# Patient Record
Sex: FEMALE | Race: WHITE | Employment: FULL TIME | URBAN - METROPOLITAN AREA
[De-identification: names, ages, dates, MRNs, and addresses within clinical notes are randomized per-mention and may not be internally consistent; named-entity substitution may affect disease eponyms.]

---

## 2017-05-19 ENCOUNTER — TELEPHONE (OUTPATIENT)
Dept: FAMILY MEDICINE CLINIC | Age: 52
End: 2017-05-19

## 2017-05-19 DIAGNOSIS — Z00.00 ANNUAL PHYSICAL EXAM: Primary | ICD-10-CM

## 2017-05-25 DIAGNOSIS — Z00.00 ANNUAL PHYSICAL EXAM: ICD-10-CM

## 2017-05-25 LAB
A/G RATIO: 2.1 (ref 1.1–2.2)
ALBUMIN SERPL-MCNC: 4.7 G/DL (ref 3.4–5)
ALP BLD-CCNC: 75 U/L (ref 40–129)
ALT SERPL-CCNC: 19 U/L (ref 10–40)
ANION GAP SERPL CALCULATED.3IONS-SCNC: 14 MMOL/L (ref 3–16)
AST SERPL-CCNC: 22 U/L (ref 15–37)
BASOPHILS ABSOLUTE: 0 K/UL (ref 0–0.2)
BASOPHILS RELATIVE PERCENT: 0.6 %
BILIRUB SERPL-MCNC: 0.5 MG/DL (ref 0–1)
BUN BLDV-MCNC: 17 MG/DL (ref 7–20)
CALCIUM SERPL-MCNC: 9.6 MG/DL (ref 8.3–10.6)
CHLORIDE BLD-SCNC: 103 MMOL/L (ref 99–110)
CHOLESTEROL, TOTAL: 200 MG/DL (ref 0–199)
CO2: 25 MMOL/L (ref 21–32)
CREAT SERPL-MCNC: 0.7 MG/DL (ref 0.6–1.1)
EOSINOPHILS ABSOLUTE: 0.2 K/UL (ref 0–0.6)
EOSINOPHILS RELATIVE PERCENT: 3.8 %
GFR AFRICAN AMERICAN: >60
GFR NON-AFRICAN AMERICAN: >60
GLOBULIN: 2.2 G/DL
GLUCOSE BLD-MCNC: 91 MG/DL (ref 70–99)
HCT VFR BLD CALC: 41.2 % (ref 36–48)
HDLC SERPL-MCNC: 64 MG/DL (ref 40–60)
HEMOGLOBIN: 13.5 G/DL (ref 12–16)
LDL CHOLESTEROL CALCULATED: 114 MG/DL
LYMPHOCYTES ABSOLUTE: 2.1 K/UL (ref 1–5.1)
LYMPHOCYTES RELATIVE PERCENT: 36 %
MCH RBC QN AUTO: 30.5 PG (ref 26–34)
MCHC RBC AUTO-ENTMCNC: 32.8 G/DL (ref 31–36)
MCV RBC AUTO: 92.9 FL (ref 80–100)
MONOCYTES ABSOLUTE: 0.6 K/UL (ref 0–1.3)
MONOCYTES RELATIVE PERCENT: 10.8 %
NEUTROPHILS ABSOLUTE: 2.8 K/UL (ref 1.7–7.7)
NEUTROPHILS RELATIVE PERCENT: 48.8 %
PDW BLD-RTO: 12.7 % (ref 12.4–15.4)
PLATELET # BLD: 258 K/UL (ref 135–450)
PMV BLD AUTO: 8.4 FL (ref 5–10.5)
POTASSIUM SERPL-SCNC: 4.4 MMOL/L (ref 3.5–5.1)
RBC # BLD: 4.44 M/UL (ref 4–5.2)
SODIUM BLD-SCNC: 142 MMOL/L (ref 136–145)
TOTAL PROTEIN: 6.9 G/DL (ref 6.4–8.2)
TRIGL SERPL-MCNC: 111 MG/DL (ref 0–150)
TSH REFLEX: 1.55 UIU/ML (ref 0.27–4.2)
VITAMIN D 25-HYDROXY: 30.5 NG/ML
VLDLC SERPL CALC-MCNC: 22 MG/DL
WBC # BLD: 5.8 K/UL (ref 4–11)

## 2017-05-31 ENCOUNTER — OFFICE VISIT (OUTPATIENT)
Dept: FAMILY MEDICINE CLINIC | Age: 52
End: 2017-05-31

## 2017-05-31 VITALS
HEART RATE: 65 BPM | SYSTOLIC BLOOD PRESSURE: 98 MMHG | HEIGHT: 61 IN | DIASTOLIC BLOOD PRESSURE: 76 MMHG | BODY MASS INDEX: 27.79 KG/M2 | OXYGEN SATURATION: 98 % | RESPIRATION RATE: 16 BRPM | WEIGHT: 147.2 LBS

## 2017-05-31 DIAGNOSIS — Z23 NEED FOR PNEUMOCOCCAL VACCINATION: ICD-10-CM

## 2017-05-31 DIAGNOSIS — R31.9 BLOOD IN URINE: ICD-10-CM

## 2017-05-31 DIAGNOSIS — Z00.00 PHYSICAL EXAM: Primary | ICD-10-CM

## 2017-05-31 DIAGNOSIS — Z71.2 ENCOUNTER TO DISCUSS TEST RESULTS: ICD-10-CM

## 2017-05-31 LAB
BACTERIA URINE, POC: NORMAL
BILIRUBIN URINE: 0 MG/DL
BLOOD, URINE: POSITIVE
CASTS URINE, POC: NORMAL
CLARITY: CLEAR
COLOR: YELLOW
CRYSTALS URINE, POC: NORMAL
EPI CELLS URINE, POC: NORMAL
GLUCOSE URINE: NORMAL
KETONES, URINE: NEGATIVE
LEUKOCYTE EST, POC: NORMAL
NITRITE, URINE: NEGATIVE
PH UA: 6 (ref 4.5–8)
PROTEIN UA: NEGATIVE
RBC URINE, POC: NORMAL
SPECIFIC GRAVITY UA: 1.01 (ref 1–1.03)
UROBILINOGEN, URINE: NORMAL
WBC URINE, POC: NORMAL
YEAST URINE, POC: NORMAL

## 2017-05-31 PROCEDURE — 90732 PPSV23 VACC 2 YRS+ SUBQ/IM: CPT | Performed by: FAMILY MEDICINE

## 2017-05-31 PROCEDURE — 99396 PREV VISIT EST AGE 40-64: CPT | Performed by: FAMILY MEDICINE

## 2017-05-31 PROCEDURE — 90471 IMMUNIZATION ADMIN: CPT | Performed by: FAMILY MEDICINE

## 2017-05-31 PROCEDURE — 81000 URINALYSIS NONAUTO W/SCOPE: CPT | Performed by: FAMILY MEDICINE

## 2017-05-31 ASSESSMENT — PATIENT HEALTH QUESTIONNAIRE - PHQ9
2. FEELING DOWN, DEPRESSED OR HOPELESS: 0
SUM OF ALL RESPONSES TO PHQ9 QUESTIONS 1 & 2: 0
SUM OF ALL RESPONSES TO PHQ QUESTIONS 1-9: 0
1. LITTLE INTEREST OR PLEASURE IN DOING THINGS: 0

## 2017-06-02 LAB — URINE CULTURE, ROUTINE: NORMAL

## 2017-06-29 ENCOUNTER — HOSPITAL ENCOUNTER (OUTPATIENT)
Dept: MAMMOGRAPHY | Age: 52
Discharge: HOME | End: 2017-06-29
Attending: OBSTETRICS & GYNECOLOGY | Admitting: OBSTETRICS & GYNECOLOGY

## 2017-06-29 DIAGNOSIS — Z12.31 VISIT FOR SCREENING MAMMOGRAM: ICD-10-CM

## 2017-11-22 ENCOUNTER — OFFICE VISIT (OUTPATIENT)
Dept: FAMILY MEDICINE CLINIC | Age: 52
End: 2017-11-22

## 2017-11-22 VITALS
RESPIRATION RATE: 16 BRPM | WEIGHT: 147.8 LBS | DIASTOLIC BLOOD PRESSURE: 75 MMHG | HEART RATE: 85 BPM | SYSTOLIC BLOOD PRESSURE: 100 MMHG | OXYGEN SATURATION: 97 % | TEMPERATURE: 97.9 F | HEIGHT: 61 IN | BODY MASS INDEX: 27.9 KG/M2

## 2017-11-22 DIAGNOSIS — J06.9 UPPER RESPIRATORY TRACT INFECTION, UNSPECIFIED TYPE: Primary | ICD-10-CM

## 2017-11-22 DIAGNOSIS — J34.89 SINUS PAIN: ICD-10-CM

## 2017-11-22 DIAGNOSIS — H69.83 EUSTACHIAN TUBE DYSFUNCTION, BILATERAL: ICD-10-CM

## 2017-11-22 PROCEDURE — 99214 OFFICE O/P EST MOD 30 MIN: CPT | Performed by: FAMILY MEDICINE

## 2017-11-22 RX ORDER — LORATADINE 10 MG/1
10 TABLET ORAL DAILY
Qty: 30 TABLET | Refills: 1 | Status: SHIPPED | OUTPATIENT
Start: 2017-11-22 | End: 2017-12-22

## 2017-11-22 RX ORDER — AZITHROMYCIN 250 MG/1
TABLET, FILM COATED ORAL
Qty: 6 TABLET | Refills: 0 | Status: SHIPPED | OUTPATIENT
Start: 2017-11-22 | End: 2018-11-27

## 2017-11-22 RX ORDER — FLUTICASONE PROPIONATE 50 MCG
2 SPRAY, SUSPENSION (ML) NASAL DAILY
Qty: 1 BOTTLE | Refills: 3 | Status: SHIPPED | OUTPATIENT
Start: 2017-11-22 | End: 2018-11-30 | Stop reason: ALTCHOICE

## 2017-11-22 NOTE — PROGRESS NOTES
Roomed By Iqra Beal
rhonchi. Symmetric rise  CARDIOVASCULAR: RRR,  no murmur, no rub, pulses 2+  EXTREMETIES: Normal movement of all extremities. No edema. No joint swelling. NEURO:  GCS 15, CN2-12 grossly intact, no focal motor/sensory deficits, no cerebellar deficits, normal gait, normal speech      Assessment/Plan     67-year-old female with upper respiratory infection. We'll treat with azithromycin. There may be also a seasonal allergy component, we'll treat with Claritin. Can also be a component of sinusitis, we'll treat with Flonase in addition to azithromycin. Discussed with patient medication/s dosage, instructions, potential S/E, A/R and Drug Interaction  Educational material provided regarding patient's condition  Tylenol or Motrin as needed for pain or fever  Advise to return here if worse or go to nearest ER  Encourage fluids  Pt discharged in stable condition at 10:56      1. Upper respiratory tract infection, unspecified type    - azithromycin (ZITHROMAX) 250 MG tablet; Take 2 tablets on day 1 and 1 tablet on days 2-5  Dispense: 6 tablet; Refill: 0    2. Eustachian tube dysfunction, bilateral    - fluticasone (FLONASE) 50 MCG/ACT nasal spray; 2 sprays by Nasal route daily  Dispense: 1 Bottle; Refill: 3  - loratadine (CLARITIN) 10 MG tablet; Take 1 tablet by mouth daily  Dispense: 30 tablet; Refill: 1    3. Sinus pain        No orders of the defined types were placed in this encounter. Return if symptoms worsen or fail to improve.     Mary Ann Moore MD    11/22/2017  10:55 AM

## 2017-11-22 NOTE — PATIENT INSTRUCTIONS
and mold. Allergies can be mild or severe. Mild allergies can be managed with home treatment. But medicine may be needed to prevent problems. Managing your allergies is an important part of staying healthy. Your doctor may suggest that you have allergy testing to help find out what is causing your allergies. When you know what things trigger your symptoms, you can avoid them. This can prevent allergy symptoms and other health problems. For severe allergies that cause reactions that affect your whole body (anaphylactic reactions), your doctor may prescribe a shot of epinephrine to carry with you in case you have a severe reaction. Learn how to give yourself the shot and keep it with you at all times. Make sure it is not . Follow-up care is a key part of your treatment and safety. Be sure to make and go to all appointments, and call your doctor if you are having problems. It's also a good idea to know your test results and keep a list of the medicines you take. How can you care for yourself at home? · If you have been told by your doctor that dust or dust mites are causing your allergy, decrease the dust around your bed:  [facility] sheets, pillowcases, and other bedding in hot water every week. ¨ Use dust-proof covers for pillows, duvets, and mattresses. Avoid plastic covers because they tear easily and do not \"breathe. \" Wash as instructed on the label. ¨ Do not use any blankets and pillows that you do not need. ¨ Use blankets that you can wash in your washing machine. ¨ Consider removing drapes and carpets, which attract and hold dust, from your bedroom. · If you are allergic to house dust and mites, do not use home humidifiers. Your doctor can suggest ways you can control dust and mites. · Look for signs of cockroaches. Cockroaches cause allergic reactions. Use cockroach baits to get rid of them. Then, clean your home well.  Cockroaches like areas where grocery bags, newspapers, empty bottles, or cardboard boxes are stored. Do not keep these inside your home, and keep trash and food containers sealed. Seal off any spots where cockroaches might enter your home. · If you are allergic to mold, get rid of furniture, rugs, and drapes that smell musty. Check for mold in the bathroom. · If you are allergic to outdoor pollen or mold spores, use air-conditioning. Change or clean all filters every month. Keep windows closed. · If you are allergic to pollen, stay inside when pollen counts are high. Use a vacuum  with a HEPA filter or a double-thickness filter at least two times each week. · Stay inside when air pollution is bad. Avoid paint fumes, perfumes, and other strong odors. · Avoid conditions that make your allergies worse. Stay away from smoke. Do not smoke or let anyone else smoke in your house. Do not use fireplaces or wood-burning stoves. · If you are allergic to your pets, change the air filter in your furnace every month. Use high-efficiency filters. · If you are allergic to pet dander, keep pets outside or out of your bedroom. Old carpet and cloth furniture can hold a lot of animal dander. You may need to replace them. When should you call for help? Give an epinephrine shot if:  · You think you are having a severe allergic reaction. · You have symptoms in more than one body area, such as mild nausea and an itchy mouth. After giving an epinephrine shot call 911, even if you feel better. Call 911 if:  · You have symptoms of a severe allergic reaction. These may include:  ¨ Sudden raised, red areas (hives) all over your body. ¨ Swelling of the throat, mouth, lips, or tongue. ¨ Trouble breathing. ¨ Passing out (losing consciousness). Or you may feel very lightheaded or suddenly feel weak, confused, or restless. · You have been given an epinephrine shot, even if you feel better.   Call your doctor now or seek immediate medical care if:  · You have symptoms of an allergic reaction, such

## 2018-05-24 ENCOUNTER — TELEPHONE (OUTPATIENT)
Dept: FAMILY MEDICINE CLINIC | Age: 53
End: 2018-05-24

## 2018-05-24 DIAGNOSIS — Z00.00 ANNUAL PHYSICAL EXAM: ICD-10-CM

## 2018-05-24 DIAGNOSIS — E55.9 VITAMIN D DEFICIENCY: Primary | ICD-10-CM

## 2018-05-31 DIAGNOSIS — Z00.00 ANNUAL PHYSICAL EXAM: ICD-10-CM

## 2018-05-31 DIAGNOSIS — E55.9 VITAMIN D DEFICIENCY: ICD-10-CM

## 2018-05-31 LAB
A/G RATIO: 1.8 (ref 1.1–2.2)
ALBUMIN SERPL-MCNC: 4.6 G/DL (ref 3.4–5)
ALP BLD-CCNC: 72 U/L (ref 40–129)
ALT SERPL-CCNC: 17 U/L (ref 10–40)
ANION GAP SERPL CALCULATED.3IONS-SCNC: 13 MMOL/L (ref 3–16)
AST SERPL-CCNC: 22 U/L (ref 15–37)
BASOPHILS ABSOLUTE: 0 K/UL (ref 0–0.2)
BASOPHILS RELATIVE PERCENT: 0.4 %
BILIRUB SERPL-MCNC: 0.4 MG/DL (ref 0–1)
BUN BLDV-MCNC: 15 MG/DL (ref 7–20)
CALCIUM SERPL-MCNC: 9.7 MG/DL (ref 8.3–10.6)
CHLORIDE BLD-SCNC: 104 MMOL/L (ref 99–110)
CHOLESTEROL, TOTAL: 197 MG/DL (ref 0–199)
CO2: 26 MMOL/L (ref 21–32)
CREAT SERPL-MCNC: 0.7 MG/DL (ref 0.6–1.1)
EOSINOPHILS ABSOLUTE: 0.2 K/UL (ref 0–0.6)
EOSINOPHILS RELATIVE PERCENT: 3.4 %
GFR AFRICAN AMERICAN: >60
GFR NON-AFRICAN AMERICAN: >60
GLOBULIN: 2.5 G/DL
GLUCOSE BLD-MCNC: 89 MG/DL (ref 70–99)
HCT VFR BLD CALC: 39.7 % (ref 36–48)
HDLC SERPL-MCNC: 68 MG/DL (ref 40–60)
HEMOGLOBIN: 13.9 G/DL (ref 12–16)
LDL CHOLESTEROL CALCULATED: 110 MG/DL
LYMPHOCYTES ABSOLUTE: 2.2 K/UL (ref 1–5.1)
LYMPHOCYTES RELATIVE PERCENT: 36.6 %
MCH RBC QN AUTO: 31.8 PG (ref 26–34)
MCHC RBC AUTO-ENTMCNC: 35 G/DL (ref 31–36)
MCV RBC AUTO: 90.9 FL (ref 80–100)
MONOCYTES ABSOLUTE: 0.7 K/UL (ref 0–1.3)
MONOCYTES RELATIVE PERCENT: 11.3 %
NEUTROPHILS ABSOLUTE: 2.9 K/UL (ref 1.7–7.7)
NEUTROPHILS RELATIVE PERCENT: 48.3 %
PDW BLD-RTO: 12.4 % (ref 12.4–15.4)
PLATELET # BLD: 351 K/UL (ref 135–450)
PMV BLD AUTO: 7.8 FL (ref 5–10.5)
POTASSIUM SERPL-SCNC: 4.9 MMOL/L (ref 3.5–5.1)
RBC # BLD: 4.37 M/UL (ref 4–5.2)
SODIUM BLD-SCNC: 143 MMOL/L (ref 136–145)
TOTAL PROTEIN: 7.1 G/DL (ref 6.4–8.2)
TRIGL SERPL-MCNC: 95 MG/DL (ref 0–150)
TSH REFLEX: 1.65 UIU/ML (ref 0.27–4.2)
VITAMIN D 25-HYDROXY: 26.5 NG/ML
VLDLC SERPL CALC-MCNC: 19 MG/DL
WBC # BLD: 6 K/UL (ref 4–11)

## 2018-06-04 ENCOUNTER — OFFICE VISIT (OUTPATIENT)
Dept: FAMILY MEDICINE CLINIC | Age: 53
End: 2018-06-04

## 2018-06-04 VITALS
WEIGHT: 147.2 LBS | HEART RATE: 68 BPM | SYSTOLIC BLOOD PRESSURE: 100 MMHG | BODY MASS INDEX: 27.79 KG/M2 | DIASTOLIC BLOOD PRESSURE: 80 MMHG | HEIGHT: 61 IN | OXYGEN SATURATION: 99 %

## 2018-06-04 DIAGNOSIS — Z71.2 ENCOUNTER TO DISCUSS TEST RESULTS: ICD-10-CM

## 2018-06-04 DIAGNOSIS — Z80.51 FHX: KIDNEY CANCER: ICD-10-CM

## 2018-06-04 DIAGNOSIS — E55.9 VITAMIN D DEFICIENCY: ICD-10-CM

## 2018-06-04 DIAGNOSIS — Z00.00 PHYSICAL EXAM, ROUTINE: Primary | ICD-10-CM

## 2018-06-04 DIAGNOSIS — Z80.0 FHX: COLON CANCER: ICD-10-CM

## 2018-06-04 LAB
BACTERIA URINE, POC: ABNORMAL
BILIRUBIN URINE: 0 MG/DL
BLOOD, URINE: NEGATIVE
CASTS URINE, POC: ABNORMAL
CLARITY: ABNORMAL
COLOR: COLORLESS
CRYSTALS URINE, POC: ABNORMAL
EPI CELLS URINE, POC: ABNORMAL
GLUCOSE URINE: ABNORMAL
KETONES, URINE: NEGATIVE
LEUKOCYTE EST, POC: ABNORMAL
NITRITE, URINE: NEGATIVE
PH UA: 7 (ref 4.5–8)
PROTEIN UA: NEGATIVE
RBC URINE, POC: ABNORMAL
SPECIFIC GRAVITY UA: 1.01 (ref 1–1.03)
UROBILINOGEN, URINE: NORMAL
WBC URINE, POC: ABNORMAL
YEAST URINE, POC: ABNORMAL

## 2018-06-04 PROCEDURE — 99396 PREV VISIT EST AGE 40-64: CPT | Performed by: FAMILY MEDICINE

## 2018-06-04 PROCEDURE — 81000 URINALYSIS NONAUTO W/SCOPE: CPT | Performed by: FAMILY MEDICINE

## 2018-06-04 ASSESSMENT — PATIENT HEALTH QUESTIONNAIRE - PHQ9
SUM OF ALL RESPONSES TO PHQ9 QUESTIONS 1 & 2: 0
SUM OF ALL RESPONSES TO PHQ QUESTIONS 1-9: 0
1. LITTLE INTEREST OR PLEASURE IN DOING THINGS: 0
2. FEELING DOWN, DEPRESSED OR HOPELESS: 0

## 2018-06-06 LAB — URINE CULTURE, ROUTINE: NORMAL

## 2018-06-18 ENCOUNTER — OFFICE VISIT (OUTPATIENT)
Dept: FAMILY MEDICINE CLINIC | Age: 53
End: 2018-06-18

## 2018-06-18 VITALS
WEIGHT: 145.4 LBS | SYSTOLIC BLOOD PRESSURE: 100 MMHG | BODY MASS INDEX: 27.47 KG/M2 | OXYGEN SATURATION: 98 % | HEART RATE: 110 BPM | DIASTOLIC BLOOD PRESSURE: 64 MMHG

## 2018-06-18 DIAGNOSIS — N30.00 ACUTE CYSTITIS WITHOUT HEMATURIA: ICD-10-CM

## 2018-06-18 DIAGNOSIS — R10.32 INTERMITTENT LEFT LOWER QUADRANT ABDOMINAL PAIN: Primary | ICD-10-CM

## 2018-06-18 LAB
BACTERIA URINE, POC: NORMAL
BILIRUBIN URINE: 0 MG/DL
BLOOD, URINE: POSITIVE
CASTS URINE, POC: NORMAL
CLARITY: CLEAR
COLOR: YELLOW
CRYSTALS URINE, POC: NORMAL
EPI CELLS URINE, POC: NORMAL
GLUCOSE URINE: NORMAL
KETONES, URINE: NEGATIVE
LEUKOCYTE EST, POC: NORMAL
NITRITE, URINE: NEGATIVE
PH UA: 7 (ref 4.5–8)
PROTEIN UA: NEGATIVE
RBC URINE, POC: NORMAL
SPECIFIC GRAVITY UA: 1.01 (ref 1–1.03)
UROBILINOGEN, URINE: NORMAL
WBC URINE, POC: NORMAL
YEAST URINE, POC: NORMAL

## 2018-06-18 PROCEDURE — 99213 OFFICE O/P EST LOW 20 MIN: CPT | Performed by: FAMILY MEDICINE

## 2018-06-18 PROCEDURE — 81000 URINALYSIS NONAUTO W/SCOPE: CPT | Performed by: FAMILY MEDICINE

## 2018-06-18 RX ORDER — CIPROFLOXACIN 250 MG/1
250 TABLET, FILM COATED ORAL 2 TIMES DAILY
Qty: 20 TABLET | Refills: 0 | Status: SHIPPED | OUTPATIENT
Start: 2018-06-18 | End: 2018-06-28

## 2018-06-20 ENCOUNTER — TELEPHONE (OUTPATIENT)
Dept: FAMILY MEDICINE CLINIC | Age: 53
End: 2018-06-20

## 2018-06-20 LAB — URINE CULTURE, ROUTINE: NORMAL

## 2018-06-21 ENCOUNTER — HOSPITAL ENCOUNTER (OUTPATIENT)
Dept: CT IMAGING | Age: 53
Discharge: HOME | End: 2018-06-21
Attending: FAMILY MEDICINE | Admitting: FAMILY MEDICINE

## 2018-06-21 DIAGNOSIS — R10.32 INTERMITTENT LEFT LOWER QUADRANT ABDOMINAL PAIN: ICD-10-CM

## 2018-06-21 DIAGNOSIS — R10.32 LEFT LOWER QUADRANT PAIN: ICD-10-CM

## 2018-06-21 RX ORDER — METRONIDAZOLE 500 MG/1
500 TABLET ORAL 3 TIMES DAILY
Qty: 21 TABLET | Refills: 0 | Status: SHIPPED | OUTPATIENT
Start: 2018-06-21 | End: 2018-06-28

## 2018-06-29 ENCOUNTER — HOSPITAL ENCOUNTER (OUTPATIENT)
Dept: MAMMOGRAPHY | Age: 53
Discharge: HOME | End: 2018-06-29
Attending: FAMILY MEDICINE | Admitting: FAMILY MEDICINE

## 2018-06-29 DIAGNOSIS — Z12.31 VISIT FOR SCREENING MAMMOGRAM: ICD-10-CM

## 2018-08-13 ENCOUNTER — TELEPHONE (OUTPATIENT)
Dept: FAMILY MEDICINE CLINIC | Age: 53
End: 2018-08-13

## 2018-08-13 NOTE — TELEPHONE ENCOUNTER
Pt states Dr. Rosalba Stone left a message for pt stating she needs an MMR booster and pt could be squeezed in. Scheduled MA visit for Sat. 8/18/18.

## 2018-08-18 ENCOUNTER — NURSE ONLY (OUTPATIENT)
Dept: FAMILY MEDICINE CLINIC | Age: 53
End: 2018-08-18

## 2018-08-18 DIAGNOSIS — Z23 NEED FOR MMR VACCINE: Primary | ICD-10-CM

## 2018-08-18 PROCEDURE — 90707 MMR VACCINE SC: CPT | Performed by: NURSE PRACTITIONER

## 2018-08-18 PROCEDURE — 90471 IMMUNIZATION ADMIN: CPT | Performed by: NURSE PRACTITIONER

## 2018-08-23 ENCOUNTER — TELEPHONE (OUTPATIENT)
Dept: FAMILY MEDICINE CLINIC | Age: 53
End: 2018-08-23

## 2018-08-23 NOTE — TELEPHONE ENCOUNTER
I gave signed form to front office on Tues to mail to her   But will redo as completed form  was not scanned in as requested   Plz Put copy in my basket to complete again

## 2018-08-23 NOTE — TELEPHONE ENCOUNTER
Patient left a employment form for completion by Dr. Obed Payton  Patient did have the MMR booster at the office and thought form would be completed  Please check on form and contact patient

## 2018-11-15 ENCOUNTER — HOSPITAL ENCOUNTER (OUTPATIENT)
Dept: MAMMOGRAPHY | Age: 53
Discharge: HOME | End: 2018-11-15
Payer: COMMERCIAL

## 2018-11-15 DIAGNOSIS — N63.0 LUMP OR MASS IN BREAST: ICD-10-CM

## 2018-11-15 PROCEDURE — 88305 TISSUE EXAM BY PATHOLOGIST: CPT

## 2018-11-15 PROCEDURE — G0279 TOMOSYNTHESIS, MAMMO: HCPCS

## 2018-11-15 PROCEDURE — 88360 TUMOR IMMUNOHISTOCHEM/MANUAL: CPT

## 2018-11-15 PROCEDURE — 88342 IMHCHEM/IMCYTCHM 1ST ANTB: CPT

## 2018-11-15 PROCEDURE — 88341 IMHCHEM/IMCYTCHM EA ADD ANTB: CPT

## 2018-11-27 ENCOUNTER — OFFICE VISIT (OUTPATIENT)
Dept: FAMILY MEDICINE CLINIC | Age: 53
End: 2018-11-27
Payer: COMMERCIAL

## 2018-11-27 ENCOUNTER — HOSPITAL ENCOUNTER (OUTPATIENT)
Dept: GENERAL RADIOLOGY | Age: 53
Discharge: HOME | End: 2018-11-27
Payer: COMMERCIAL

## 2018-11-27 ENCOUNTER — HOSPITAL ENCOUNTER (OUTPATIENT)
Age: 53
Discharge: HOME | End: 2018-11-27
Payer: COMMERCIAL

## 2018-11-27 VITALS
OXYGEN SATURATION: 98 % | HEIGHT: 61 IN | WEIGHT: 143 LBS | SYSTOLIC BLOOD PRESSURE: 120 MMHG | BODY MASS INDEX: 27 KG/M2 | TEMPERATURE: 99.5 F | HEART RATE: 74 BPM | DIASTOLIC BLOOD PRESSURE: 74 MMHG

## 2018-11-27 DIAGNOSIS — Z01.818 PRE-OP EXAM: ICD-10-CM

## 2018-11-27 DIAGNOSIS — Z01.818 PRE-OP EXAM: Primary | ICD-10-CM

## 2018-11-27 DIAGNOSIS — C50.012 PAGET'S DISEASE OF LEFT BREAST (HCC): ICD-10-CM

## 2018-11-27 LAB
A/G RATIO: 1.8 (ref 1.1–2.2)
ALBUMIN SERPL-MCNC: 4.9 G/DL (ref 3.4–5)
ALP BLD-CCNC: 82 U/L (ref 40–129)
ALT SERPL-CCNC: 13 U/L (ref 10–40)
ANION GAP SERPL CALCULATED.3IONS-SCNC: 12 MMOL/L (ref 3–16)
AST SERPL-CCNC: 17 U/L (ref 15–37)
BASOPHILS ABSOLUTE: 0.1 K/UL (ref 0–0.2)
BASOPHILS RELATIVE PERCENT: 1 %
BILIRUB SERPL-MCNC: 0.3 MG/DL (ref 0–1)
BUN BLDV-MCNC: 15 MG/DL (ref 7–20)
CALCIUM SERPL-MCNC: 10.5 MG/DL (ref 8.3–10.6)
CHLORIDE BLD-SCNC: 103 MMOL/L (ref 99–110)
CO2: 26 MMOL/L (ref 21–32)
CREAT SERPL-MCNC: 0.6 MG/DL (ref 0.6–1.1)
EOSINOPHILS ABSOLUTE: 0.1 K/UL (ref 0–0.6)
EOSINOPHILS RELATIVE PERCENT: 1.4 %
GFR AFRICAN AMERICAN: >60
GFR NON-AFRICAN AMERICAN: >60
GLOBULIN: 2.7 G/DL
GLUCOSE BLD-MCNC: 97 MG/DL (ref 70–99)
HCT VFR BLD CALC: 39.9 % (ref 36–48)
HEMOGLOBIN: 13.6 G/DL (ref 12–16)
HEPATITIS C ANTIBODY INTERPRETATION: NORMAL
LYMPHOCYTES ABSOLUTE: 1.9 K/UL (ref 1–5.1)
LYMPHOCYTES RELATIVE PERCENT: 29.5 %
MCH RBC QN AUTO: 31.8 PG (ref 26–34)
MCHC RBC AUTO-ENTMCNC: 34 G/DL (ref 31–36)
MCV RBC AUTO: 93.6 FL (ref 80–100)
MONOCYTES ABSOLUTE: 0.6 K/UL (ref 0–1.3)
MONOCYTES RELATIVE PERCENT: 9.8 %
NEUTROPHILS ABSOLUTE: 3.7 K/UL (ref 1.7–7.7)
NEUTROPHILS RELATIVE PERCENT: 58.3 %
PDW BLD-RTO: 12.3 % (ref 12.4–15.4)
PLATELET # BLD: 305 K/UL (ref 135–450)
PMV BLD AUTO: 8.5 FL (ref 5–10.5)
POTASSIUM SERPL-SCNC: 4.7 MMOL/L (ref 3.5–5.1)
RBC # BLD: 4.26 M/UL (ref 4–5.2)
SODIUM BLD-SCNC: 141 MMOL/L (ref 136–145)
TOTAL PROTEIN: 7.6 G/DL (ref 6.4–8.2)
WBC # BLD: 6.3 K/UL (ref 4–11)

## 2018-11-27 PROCEDURE — 90471 IMMUNIZATION ADMIN: CPT | Performed by: FAMILY MEDICINE

## 2018-11-27 PROCEDURE — 90682 RIV4 VACC RECOMBINANT DNA IM: CPT | Performed by: FAMILY MEDICINE

## 2018-11-27 PROCEDURE — 99214 OFFICE O/P EST MOD 30 MIN: CPT | Performed by: FAMILY MEDICINE

## 2018-11-27 PROCEDURE — 71046 X-RAY EXAM CHEST 2 VIEWS: CPT

## 2018-11-28 ENCOUNTER — HOSPITAL ENCOUNTER (OUTPATIENT)
Dept: MRI IMAGING | Age: 53
Discharge: HOME | End: 2018-11-28
Payer: COMMERCIAL

## 2018-11-28 DIAGNOSIS — C50.012 PAGET'S DISEASE OF THE BREAST, LEFT (HCC): ICD-10-CM

## 2018-11-28 LAB
HIV AG/AB: NORMAL
HIV ANTIGEN: NORMAL
HIV-1 ANTIBODY: NORMAL
HIV-2 AB: NORMAL

## 2018-11-28 PROCEDURE — A9579 GAD-BASE MR CONTRAST NOS,1ML: HCPCS | Performed by: SURGERY

## 2018-11-28 PROCEDURE — C8908 MRI W/O FOL W/CONT, BREAST,: HCPCS

## 2018-11-28 PROCEDURE — 6360000004 HC RX CONTRAST MEDICATION: Performed by: SURGERY

## 2018-11-28 RX ADMIN — GADOTERIDOL 14 ML: 279.3 INJECTION, SOLUTION INTRAVENOUS at 09:26

## 2018-11-30 DIAGNOSIS — C50.012 PAGET'S CARCINOMA OF THE NIPPLE, LEFT (HCC): ICD-10-CM

## 2018-11-30 DIAGNOSIS — R92.8 ABNORMAL MRI, BREAST: Primary | ICD-10-CM

## 2018-12-03 ENCOUNTER — HOSPITAL ENCOUNTER (OUTPATIENT)
Dept: ULTRASOUND IMAGING | Age: 53
Discharge: HOME | End: 2018-12-03
Payer: COMMERCIAL

## 2018-12-03 ENCOUNTER — ANESTHESIA EVENT (OUTPATIENT)
Dept: OPERATING ROOM | Age: 53
End: 2018-12-03
Payer: COMMERCIAL

## 2018-12-03 DIAGNOSIS — R92.8 ABNORMAL MRI, BREAST: ICD-10-CM

## 2018-12-03 PROCEDURE — 76642 ULTRASOUND BREAST LIMITED: CPT

## 2018-12-04 ENCOUNTER — HOSPITAL ENCOUNTER (OUTPATIENT)
Age: 53
Setting detail: OUTPATIENT SURGERY
Discharge: HOME | End: 2018-12-04
Attending: SURGERY | Admitting: SURGERY
Payer: COMMERCIAL

## 2018-12-04 ENCOUNTER — ANESTHESIA (OUTPATIENT)
Dept: OPERATING ROOM | Age: 53
End: 2018-12-04
Payer: COMMERCIAL

## 2018-12-04 VITALS
HEIGHT: 61 IN | WEIGHT: 143 LBS | SYSTOLIC BLOOD PRESSURE: 106 MMHG | HEART RATE: 60 BPM | BODY MASS INDEX: 27 KG/M2 | DIASTOLIC BLOOD PRESSURE: 69 MMHG | TEMPERATURE: 97.8 F | OXYGEN SATURATION: 100 % | RESPIRATION RATE: 13 BRPM

## 2018-12-04 VITALS — SYSTOLIC BLOOD PRESSURE: 77 MMHG | OXYGEN SATURATION: 95 % | DIASTOLIC BLOOD PRESSURE: 41 MMHG

## 2018-12-04 DIAGNOSIS — C50.012 PAGET'S CARCINOMA OF THE NIPPLE, LEFT (HCC): Primary | ICD-10-CM

## 2018-12-04 LAB — PREGNANCY, URINE: NEGATIVE

## 2018-12-04 PROCEDURE — 7100000010 HC PHASE II RECOVERY - FIRST 15 MIN: Performed by: SURGERY

## 2018-12-04 PROCEDURE — 7100000001 HC PACU RECOVERY - ADDTL 15 MIN: Performed by: SURGERY

## 2018-12-04 PROCEDURE — 6360000002 HC RX W HCPCS: Performed by: SURGERY

## 2018-12-04 PROCEDURE — 3700000000 HC ANESTHESIA ATTENDED CARE: Performed by: SURGERY

## 2018-12-04 PROCEDURE — 84703 CHORIONIC GONADOTROPIN ASSAY: CPT

## 2018-12-04 PROCEDURE — A4648 IMPLANTABLE TISSUE MARKER: HCPCS | Performed by: SURGERY

## 2018-12-04 PROCEDURE — 88305 TISSUE EXAM BY PATHOLOGIST: CPT

## 2018-12-04 PROCEDURE — 2500000003 HC RX 250 WO HCPCS: Performed by: NURSE ANESTHETIST, CERTIFIED REGISTERED

## 2018-12-04 PROCEDURE — 88360 TUMOR IMMUNOHISTOCHEM/MANUAL: CPT

## 2018-12-04 PROCEDURE — 3700000001 HC ADD 15 MINUTES (ANESTHESIA): Performed by: SURGERY

## 2018-12-04 PROCEDURE — 6360000002 HC RX W HCPCS: Performed by: NURSE ANESTHETIST, CERTIFIED REGISTERED

## 2018-12-04 PROCEDURE — 3600000003 HC SURGERY LEVEL 3 BASE: Performed by: SURGERY

## 2018-12-04 PROCEDURE — 2500000003 HC RX 250 WO HCPCS: Performed by: SURGERY

## 2018-12-04 PROCEDURE — 88307 TISSUE EXAM BY PATHOLOGIST: CPT

## 2018-12-04 PROCEDURE — 2580000003 HC RX 258: Performed by: ANESTHESIOLOGY

## 2018-12-04 PROCEDURE — 2709999900 HC NON-CHARGEABLE SUPPLY: Performed by: SURGERY

## 2018-12-04 PROCEDURE — 7100000000 HC PACU RECOVERY - FIRST 15 MIN: Performed by: SURGERY

## 2018-12-04 PROCEDURE — 3600000013 HC SURGERY LEVEL 3 ADDTL 15MIN: Performed by: SURGERY

## 2018-12-04 PROCEDURE — 2580000003 HC RX 258: Performed by: SURGERY

## 2018-12-04 PROCEDURE — 7100000011 HC PHASE II RECOVERY - ADDTL 15 MIN: Performed by: SURGERY

## 2018-12-04 DEVICE — THE MARKER IS A RADIOGRAPHIC IMPLANTABLE MARKER USED TO MARK SOFT TISSUE.IT IS COMPRISED OF A BIOABSORBABLE SPACER THAT HOLDS RADIOPAQUE MARKER CLIPS.
Type: IMPLANTABLE DEVICE | Site: BREAST | Status: FUNCTIONAL
Brand: BIOZORB MARKER

## 2018-12-04 RX ORDER — FENTANYL CITRATE 50 UG/ML
50 INJECTION, SOLUTION INTRAMUSCULAR; INTRAVENOUS EVERY 5 MIN PRN
Status: DISCONTINUED | OUTPATIENT
Start: 2018-12-04 | End: 2018-12-04 | Stop reason: HOSPADM

## 2018-12-04 RX ORDER — FENTANYL CITRATE 50 UG/ML
INJECTION, SOLUTION INTRAMUSCULAR; INTRAVENOUS PRN
Status: DISCONTINUED | OUTPATIENT
Start: 2018-12-04 | End: 2018-12-04 | Stop reason: SDUPTHER

## 2018-12-04 RX ORDER — MAGNESIUM HYDROXIDE 1200 MG/15ML
LIQUID ORAL CONTINUOUS PRN
Status: DISCONTINUED | OUTPATIENT
Start: 2018-12-04 | End: 2018-12-04 | Stop reason: HOSPADM

## 2018-12-04 RX ORDER — SODIUM CHLORIDE 0.9 % (FLUSH) 0.9 %
10 SYRINGE (ML) INJECTION EVERY 12 HOURS SCHEDULED
Status: DISCONTINUED | OUTPATIENT
Start: 2018-12-04 | End: 2018-12-04 | Stop reason: HOSPADM

## 2018-12-04 RX ORDER — PROPOFOL 10 MG/ML
INJECTION, EMULSION INTRAVENOUS CONTINUOUS PRN
Status: DISCONTINUED | OUTPATIENT
Start: 2018-12-04 | End: 2018-12-04 | Stop reason: SDUPTHER

## 2018-12-04 RX ORDER — PROPOFOL 10 MG/ML
INJECTION, EMULSION INTRAVENOUS
Status: COMPLETED
Start: 2018-12-04 | End: 2018-12-04

## 2018-12-04 RX ORDER — SODIUM CHLORIDE 0.9 % (FLUSH) 0.9 %
10 SYRINGE (ML) INJECTION PRN
Status: DISCONTINUED | OUTPATIENT
Start: 2018-12-04 | End: 2018-12-04 | Stop reason: HOSPADM

## 2018-12-04 RX ORDER — PROMETHAZINE HYDROCHLORIDE 25 MG/ML
6.25 INJECTION, SOLUTION INTRAMUSCULAR; INTRAVENOUS
Status: DISCONTINUED | OUTPATIENT
Start: 2018-12-04 | End: 2018-12-04 | Stop reason: HOSPADM

## 2018-12-04 RX ORDER — LIDOCAINE HYDROCHLORIDE 10 MG/ML
1 INJECTION, SOLUTION EPIDURAL; INFILTRATION; INTRACAUDAL; PERINEURAL
Status: DISCONTINUED | OUTPATIENT
Start: 2018-12-04 | End: 2018-12-04 | Stop reason: HOSPADM

## 2018-12-04 RX ORDER — CEFAZOLIN SODIUM 2 G/50ML
2 SOLUTION INTRAVENOUS ONCE
Status: COMPLETED | OUTPATIENT
Start: 2018-12-04 | End: 2018-12-04

## 2018-12-04 RX ORDER — ONDANSETRON 2 MG/ML
INJECTION INTRAMUSCULAR; INTRAVENOUS PRN
Status: DISCONTINUED | OUTPATIENT
Start: 2018-12-04 | End: 2018-12-04 | Stop reason: SDUPTHER

## 2018-12-04 RX ORDER — ONDANSETRON 2 MG/ML
4 INJECTION INTRAMUSCULAR; INTRAVENOUS
Status: DISCONTINUED | OUTPATIENT
Start: 2018-12-04 | End: 2018-12-04 | Stop reason: HOSPADM

## 2018-12-04 RX ORDER — FENTANYL CITRATE 50 UG/ML
25 INJECTION, SOLUTION INTRAMUSCULAR; INTRAVENOUS EVERY 5 MIN PRN
Status: DISCONTINUED | OUTPATIENT
Start: 2018-12-04 | End: 2018-12-04 | Stop reason: HOSPADM

## 2018-12-04 RX ORDER — SODIUM CHLORIDE, SODIUM LACTATE, POTASSIUM CHLORIDE, CALCIUM CHLORIDE 600; 310; 30; 20 MG/100ML; MG/100ML; MG/100ML; MG/100ML
INJECTION, SOLUTION INTRAVENOUS CONTINUOUS
Status: DISCONTINUED | OUTPATIENT
Start: 2018-12-04 | End: 2018-12-04 | Stop reason: HOSPADM

## 2018-12-04 RX ORDER — MIDAZOLAM HYDROCHLORIDE 1 MG/ML
INJECTION INTRAMUSCULAR; INTRAVENOUS PRN
Status: DISCONTINUED | OUTPATIENT
Start: 2018-12-04 | End: 2018-12-04 | Stop reason: SDUPTHER

## 2018-12-04 RX ORDER — LIDOCAINE HYDROCHLORIDE 20 MG/ML
INJECTION, SOLUTION INFILTRATION; PERINEURAL PRN
Status: DISCONTINUED | OUTPATIENT
Start: 2018-12-04 | End: 2018-12-04 | Stop reason: SDUPTHER

## 2018-12-04 RX ORDER — HYDROCODONE BITARTRATE AND ACETAMINOPHEN 5; 325 MG/1; MG/1
1 TABLET ORAL EVERY 4 HOURS PRN
Qty: 18 TABLET | Refills: 0 | Status: SHIPPED | OUTPATIENT
Start: 2018-12-04 | End: 2018-12-07

## 2018-12-04 RX ADMIN — FENTANYL CITRATE 50 MCG: 50 INJECTION INTRAMUSCULAR; INTRAVENOUS at 08:25

## 2018-12-04 RX ADMIN — PHENYLEPHRINE HYDROCHLORIDE 80 MCG: 10 INJECTION, SOLUTION INTRAMUSCULAR; INTRAVENOUS; SUBCUTANEOUS at 08:29

## 2018-12-04 RX ADMIN — PHENYLEPHRINE HYDROCHLORIDE 80 MCG: 10 INJECTION, SOLUTION INTRAMUSCULAR; INTRAVENOUS; SUBCUTANEOUS at 08:35

## 2018-12-04 RX ADMIN — MIDAZOLAM HYDROCHLORIDE 2 MG: 1 INJECTION INTRAMUSCULAR; INTRAVENOUS at 07:37

## 2018-12-04 RX ADMIN — SODIUM CHLORIDE, SODIUM LACTATE, POTASSIUM CHLORIDE, AND CALCIUM CHLORIDE: 600; 310; 30; 20 INJECTION, SOLUTION INTRAVENOUS at 06:39

## 2018-12-04 RX ADMIN — PROPOFOL 100 MCG/KG/MIN: 10 INJECTION, EMULSION INTRAVENOUS at 07:41

## 2018-12-04 RX ADMIN — FENTANYL CITRATE 50 MCG: 50 INJECTION INTRAMUSCULAR; INTRAVENOUS at 07:45

## 2018-12-04 RX ADMIN — LIDOCAINE HYDROCHLORIDE 100 MG: 20 INJECTION, SOLUTION INFILTRATION; PERINEURAL at 07:46

## 2018-12-04 RX ADMIN — ONDANSETRON 4 MG: 2 INJECTION INTRAMUSCULAR; INTRAVENOUS at 07:47

## 2018-12-04 RX ADMIN — SODIUM CHLORIDE, SODIUM LACTATE, POTASSIUM CHLORIDE, AND CALCIUM CHLORIDE: 600; 310; 30; 20 INJECTION, SOLUTION INTRAVENOUS at 08:45

## 2018-12-04 RX ADMIN — CEFAZOLIN SODIUM 2 G: 2 SOLUTION INTRAVENOUS at 07:38

## 2018-12-04 ASSESSMENT — PULMONARY FUNCTION TESTS
PIF_VALUE: 1

## 2018-12-04 ASSESSMENT — PAIN SCALES - GENERAL
PAINLEVEL_OUTOF10: 0

## 2018-12-04 ASSESSMENT — PAIN - FUNCTIONAL ASSESSMENT: PAIN_FUNCTIONAL_ASSESSMENT: 0-10

## 2018-12-04 ASSESSMENT — LIFESTYLE VARIABLES: SMOKING_STATUS: 0

## 2018-12-04 NOTE — BRIEF OP NOTE
Brief Postoperative Note  ______________________________________________________________    Patient: Rajat Rodriguez  YOB: 1965  MRN: [id number]  Date of Procedure: 12/4/2018    Pre-Op Diagnosis: PAGET DISEASE LEFT BREAST    Post-Op Diagnosis: Same       Procedure(s):  LEFT CENTRAL LUMPECTOMY, BIOZORB MARKER    Anesthesia: Monitor Anesthesia Care    Surgeon(s):  Sarah Madrigal MD    Assistant: Иван Villarreal SA    Estimated Blood Loss (mL): less than 50     Complications: None    Specimens:   ID Type Source Tests Collected by Time Destination   A : A.  LEFT BREAST TISSUE WITH NIPPLE AREOLAR COMPLEX  Tissue Breast SURGICAL PATHOLOGY Sarah Madrigal MD 12/4/2018 5829    B : Professor Pamela [address] LEFT LUMPECTOMY Tissue Breast SURGICAL PATHOLOGY Sarah Madrigal MD 12/4/2018 7498        Implants:  * No implants in log *      Drains:      Findings: nipple areolar skin changes    Sarah Madrigal MD  Date: 12/4/2018  Time: 9:20 AM

## 2018-12-04 NOTE — PROGRESS NOTES
No antibiotic order R/c'd. [facility] for Rosa, at Dr. Richard Christian to jaleesa fax orders ASAP or call if none wanted.
PACU Transfer to \[facility]\""    Vitals:    12/04/18 0945   BP: 106/69   Pulse: 60   Resp: 13   Temp: 97.8 °F (36.6 °C)   SpO2: 100%         Intake/Output Summary (Last 24 hours) at 12/04/18 0948  Last data filed at 12/04/18 0947   Gross per 24 hour   Intake             1225 ml   Output               10 ml   Net             1215 ml       Pain assessment:  none  Pain Level: 0    Patient transferred to care of \[facility]\"" RN.    12/4/2018 9:48 AM
signs or breathing patterns. Nausea, headache, muscle aches, or sore throat may also occur after anesthesia. Your nurse will help you manage these potential side effects. 2. For comfort and safety, arrange to have someone at home with you for the first 24 hours after discharge. 3. You and your family will be given written instructions about your diet, activity, dressing care, medications, and return visits. 4. Once at home, should issues with nausea, pain, or bleeding occur, or should you notice any signs of infection, you should call your surgeon. 5. Always clean your hands before and after caring for your wound. Do not let your family touch your surgery site without cleaning their hands. 6. Narcotic pain medications can cause significant constipation. You may want to add a stool softener to your postoperative medication schedule or speak to your surgeon on how best to manage this SIDE EFFECT. SPECIAL INSTRUCTIONS     Thank you for allowing us to care for you. We strive to exceed your expectations in the delivery of care and service provided to you and your family. If you need to contact us for any reason, please call us at [phone]    Instructions reviewed with patient during preadmission testing phone interview. Greer Moon. 11/30/2018 .9:20 AM      ADDITIONAL EDUCATIONAL INFORMATION REVIEWED PER PHONE WITH YOU AND/OR YOUR FAMILY:  No Bring a urine sample on day of surgery  No Pain Goal-Taking Control of Your Pain  Yes FAQs about Surgical Site Infections  No Hibiclens® Bathing Instructions   Yes Antibacterial Soap

## 2018-12-04 NOTE — H&P
[address] [facility]    [id number]    [facility] Same Day Surgery Update H & P  Department of General Surgery   Surgical Service   Physician Assistant Pre-operative History and Physical  Last H & P within the last 30 days. DIAGNOSIS:   PAGET DISEASE LEFT BREAST    PROCEDURE:  MN MASTECTOMY, PARTIAL [59322] (LEFT CENTRAL LUMPECTOMY, BIOZORB MARKER)     HISTORY OF PRESENT ILLNESS:   Please see initial H & P    Patient has a left breast mass found on self examination. Past Medical History:        Diagnosis Date    Incomplete RBBB     Paget disease of breast, left [facility])      Past Surgical History:        Procedure Laterality Date    BREAST BIOPSY      left breast      SECTION      CHOLECYSTECTOMY, LAPAROSCOPIC  3/28/14    COLONOSCOPY      ENDOMETRIAL ABLATION       Past Social History:  Social History     Social History    Marital status:      Spouse name: Ilia Hawkins Number of children: 3    Years of education: N/A     Social History Main Topics    Smoking status: Never Smoker    Smokeless tobacco: Never Used      Comment: college days    Alcohol use Yes      Comment: occ    Drug use: No    Sexual activity: Yes     Partners: Male     Other Topics Concern    None     Social History Narrative    None         Medications Prior to Admission:      Prior to Admission medications    Medication Sig Start Date End Date Taking? Authorizing Provider   Multiple Vitamins-Minerals (THERAPEUTIC MULTIVITAMIN-MINERALS) tablet Take 1 tablet by mouth daily    Historical Provider, MD         Allergies:  Patient has no known allergies.     PHYSICAL EXAM:      /82   Pulse 86   Temp 98.2 °F (36.8 °C) (Oral)   Resp 16   Ht 5' 1\" (1.549 m)   Wt 143 lb (64.9 kg)   SpO2 96%   BMI 27.02 kg/m²      Heart:  regular rate and rhythm, no murmur    Lungs:  No increased work of breathing, good air exchange, clear to auscultation bilaterally, no crackles or wheezing    Abdomen:  soft,

## 2018-12-05 NOTE — OP NOTE
carried out using electrocautery. We cleared  approximately 15 sq. cm. A 2 x 2 BioZorb was tacked to the base of the  lumpectomy site with the mobilized layers closed over the BioZorb using  interrupted 3-0 Vicryl sutures. We then created a pursestring suture using a 4-0 Vicryl suture and then  placed 3-0 Vicryl deep dermal sutures to approximate the deep tissues. The skin edges were closed using a 5-0 chromic horizontal mattress  suture and a simple suture along the central portion of the scar to  approximate the skin edges. A Prineo patch was placed over the surgical  site and sealed with Dermabond. A fluff pressure dressing was applied  and secured with a surgical bra. The patient was taken to recovery in  satisfactory condition having tolerated the procedure well.         Jair Zabala MD    D: 12/04/2018 22:03:02       T: 12/05/2018 4:55:15     [email or web address]  Job#: [id number]     Doc#: [id number]    CC:

## 2019-02-22 ENCOUNTER — OFFICE VISIT (OUTPATIENT)
Dept: FAMILY MEDICINE CLINIC | Age: 54
End: 2019-02-22
Payer: COMMERCIAL

## 2019-02-22 VITALS
SYSTOLIC BLOOD PRESSURE: 124 MMHG | HEART RATE: 81 BPM | DIASTOLIC BLOOD PRESSURE: 76 MMHG | BODY MASS INDEX: 27.75 KG/M2 | RESPIRATION RATE: 16 BRPM | OXYGEN SATURATION: 99 % | WEIGHT: 147 LBS | HEIGHT: 61 IN

## 2019-02-22 DIAGNOSIS — R07.81 RIB PAIN ON LEFT SIDE: Primary | ICD-10-CM

## 2019-02-22 PROCEDURE — 99213 OFFICE O/P EST LOW 20 MIN: CPT | Performed by: FAMILY MEDICINE

## 2019-02-22 ASSESSMENT — PATIENT HEALTH QUESTIONNAIRE - PHQ9
1. LITTLE INTEREST OR PLEASURE IN DOING THINGS: 0
SUM OF ALL RESPONSES TO PHQ QUESTIONS 1-9: 0
SUM OF ALL RESPONSES TO PHQ9 QUESTIONS 1 & 2: 0
SUM OF ALL RESPONSES TO PHQ QUESTIONS 1-9: 0
2. FEELING DOWN, DEPRESSED OR HOPELESS: 0

## 2019-04-30 ENCOUNTER — TELEPHONE (OUTPATIENT)
Dept: FAMILY MEDICINE CLINIC | Age: 54
End: 2019-04-30

## 2019-04-30 DIAGNOSIS — Z00.00 ANNUAL PHYSICAL EXAM: ICD-10-CM

## 2019-04-30 DIAGNOSIS — E55.9 VITAMIN D DEFICIENCY: Primary | ICD-10-CM

## 2019-05-31 DIAGNOSIS — Z00.00 ANNUAL PHYSICAL EXAM: ICD-10-CM

## 2019-05-31 DIAGNOSIS — E55.9 VITAMIN D DEFICIENCY: ICD-10-CM

## 2019-05-31 LAB
A/G RATIO: 1.8 (ref 1.1–2.2)
ALBUMIN SERPL-MCNC: 4.7 G/DL (ref 3.4–5)
ALP BLD-CCNC: 80 U/L (ref 40–129)
ALT SERPL-CCNC: 15 U/L (ref 10–40)
ANION GAP SERPL CALCULATED.3IONS-SCNC: 15 MMOL/L (ref 3–16)
AST SERPL-CCNC: 19 U/L (ref 15–37)
BASOPHILS ABSOLUTE: 0 K/UL (ref 0–0.2)
BASOPHILS RELATIVE PERCENT: 0.6 %
BILIRUB SERPL-MCNC: 0.4 MG/DL (ref 0–1)
BUN BLDV-MCNC: 14 MG/DL (ref 7–20)
CALCIUM SERPL-MCNC: 9.8 MG/DL (ref 8.3–10.6)
CHLORIDE BLD-SCNC: 104 MMOL/L (ref 99–110)
CHOLESTEROL, TOTAL: 177 MG/DL (ref 0–199)
CO2: 24 MMOL/L (ref 21–32)
CREAT SERPL-MCNC: 0.8 MG/DL (ref 0.6–1.1)
EOSINOPHILS ABSOLUTE: 0.2 K/UL (ref 0–0.6)
EOSINOPHILS RELATIVE PERCENT: 4.2 %
GFR AFRICAN AMERICAN: >60
GFR NON-AFRICAN AMERICAN: >60
GLOBULIN: 2.6 G/DL
GLUCOSE BLD-MCNC: 97 MG/DL (ref 70–99)
HCT VFR BLD CALC: 40.6 % (ref 36–48)
HDLC SERPL-MCNC: 59 MG/DL (ref 40–60)
HEMOGLOBIN: 13.5 G/DL (ref 12–16)
LDL CHOLESTEROL CALCULATED: 92 MG/DL
LYMPHOCYTES ABSOLUTE: 1.5 K/UL (ref 1–5.1)
LYMPHOCYTES RELATIVE PERCENT: 30.4 %
MCH RBC QN AUTO: 30.9 PG (ref 26–34)
MCHC RBC AUTO-ENTMCNC: 33.2 G/DL (ref 31–36)
MCV RBC AUTO: 93.1 FL (ref 80–100)
MONOCYTES ABSOLUTE: 0.6 K/UL (ref 0–1.3)
MONOCYTES RELATIVE PERCENT: 12.8 %
NEUTROPHILS ABSOLUTE: 2.6 K/UL (ref 1.7–7.7)
NEUTROPHILS RELATIVE PERCENT: 52 %
PDW BLD-RTO: 12.5 % (ref 12.4–15.4)
PLATELET # BLD: 271 K/UL (ref 135–450)
PMV BLD AUTO: 8.6 FL (ref 5–10.5)
POTASSIUM SERPL-SCNC: 4.5 MMOL/L (ref 3.5–5.1)
RBC # BLD: 4.36 M/UL (ref 4–5.2)
SODIUM BLD-SCNC: 143 MMOL/L (ref 136–145)
TOTAL PROTEIN: 7.3 G/DL (ref 6.4–8.2)
TRIGL SERPL-MCNC: 130 MG/DL (ref 0–150)
TSH SERPL DL<=0.05 MIU/L-ACNC: 2.44 UIU/ML (ref 0.27–4.2)
VITAMIN D 25-HYDROXY: 25.6 NG/ML
VLDLC SERPL CALC-MCNC: 26 MG/DL
WBC # BLD: 5.1 K/UL (ref 4–11)

## 2019-06-06 NOTE — PROGRESS NOTES
Chief Complaint:   Sandy Metcalf is a 53 y.o. female who presents for complete physical examination    History of Present Illness:    Meds, vitamins and allergies reviewed with pt  Labs reviewed with pt/all acceptable    Lumpectomy in December , finished radiation Feb  Concerned about right nipple redness, ointment to move the appointment up with breast surgeon  Still with right upper quadrant discomfort, had gallbladder out, does not wake her from sleep, no fever or weight loss    Concerned that she has had radiation    Significant family history of cancers, recommend genetic screening, number given    Wt Readings from Last 3 Encounters:   19 147 lb (66.7 kg)   19 147 lb (66.7 kg)   18 143 lb (64.9 kg)       Patient Active Problem List   Diagnosis    Enlarged uterus    Heartburn    Fibrocystic breast    Vitamin D deficiency    FHx: colon cancer    FHx: kidney cancer    Paget's carcinoma of the nipple, left ([address]     Past Medical History:   Diagnosis Date    Incomplete RBBB     [address]et disease of [address], left [address] [address]        Past Surgical History:   Procedure Laterality Date    BREAST BIOPSY      left breast      SECTION      CHOLECYSTECTOMY, LAPAROSCOPIC  3/28/14    COLONOSCOPY      ENDOMETRIAL ABLATION      CO MASTECTOMY, PARTIAL Left 2018    LEFT CENTRAL LUMPECTOMY, BIOZORB MARKER performed by Jaydon Krishnan MD at [address]       Current Outpatient Medications   Medication Sig Dispense Refill    vitamin D (CHOLECALCIFEROL) 1000 UNIT TABS tablet Take 2,000 Units by mouth daily        No current facility-administered medications for this visit.       No Known Allergies    Social History     Socioeconomic History    Marital status:      Spouse name: Markie Shirley Number of children: 3    Years of education: None    Highest education level: None   Occupational History    None   Social Needs    Financial resource strain: None    Food insecurity: Worry: None     Inability: None    Transportation needs:     Medical: None     Non-medical: None   Tobacco Use    Smoking status: Never Smoker    Smokeless tobacco: Never Used    Tobacco comment: college days   Substance and Sexual Activity    Alcohol use: Yes     Comment: occ    Drug use: No    Sexual activity: Yes     Partners: Male   Lifestyle    Physical activity:     Days per week: None     Minutes per session: None    Stress: None   Relationships    Social connections:     Talks on phone: None     Gets together: None     Attends Hindu service: None     Active member of club or organization: None     Attends meetings of clubs or organizations: None     Relationship status: None    Intimate partner violence:     Fear of current or ex partner: None     Emotionally abused: None     Physically abused: None     Forced sexual activity: None   Other Topics Concern    None   Social History Narrative    None     Family History   Problem Relation Age of Onset    Lupus Mother     Cancer Mother 79        colon cancer    Cancer Father 70        renal cancer    Cancer Paternal Grandfather         Throat    Hypertension Maternal Grandmother     Coronary Art Dis Maternal Grandmother                              REVIEW OF SYSTEMS:   CONSTITUTIONAL: No major weight gain or loss, fatigue, weakness, night sweats or fever. HEENT: No new vision difficulties or ringing in the ears. RESPIRATORY: No new SOB, PND, orthopnea or cough. CARDIOVASCULAR: no CP, palpitations or SOB with exertion  GI: No nausea, vomiting, diarrhea, constipation, abdominal pain or changes in bowel habits. : No urinary frequency, urgency, incontinence hematuria or dysuria. SKIN: No cyanosis or skin lesions. MUSCULOSKELETAL: No new muscle or joint pain. NEUROLOGICAL: No syncope or TIA-like symptoms.    PSYCHIATRIC: No anxiety, insomnia or depression     PHYSICAL EXAMINATION:  /80   Pulse 75   Ht 5' 0.98\" (1.549 m) Wt 147 lb (66.7 kg)   LMP 08/03/2013   SpO2 98%   BMI 27.79 kg/m²   General appearance: healthy, alert, no distress  Skin: Skin color, texture, turgor normal. No rashes or lesions. Head: Normocephalic. No masses, lesions, tenderness or abnormalities  Eyes: conjunctivae/corneas clear. PERRL, EOM's intact. Ears: External ears normal. Canals clear. TM's clear bilaterally. Nose/Sinuses: Nares normal. Septum midline. Mucosa normal. No drainage or sinus tenderness. Oropharynx: Lips, mucosa, and tongue normal. Teeth and gums normal. Oropharynx clear with no exudate seen. Neck: Neck supple, and symmetric. No adenopathy. Thyroid symmetric, normal size, without nodule. Trachea is midline. No bruits. Back: Back symmetric, no curvature. ROM normal. No CVA tenderness. Lungs: Lungs clear to auscultation bilaterally. No retractions or use of accessory muscles. Heart: Regular rate and rhythm, with no rub, murmur or gallop noted. Breasts: no masses, skin changes or DC  Abdomen: Abdomen soft, non-tender. BS normal. No masses, organomegaly. No hernia noted. Extremities: Extremities normal. No deformities, edema, or skin discoloration. No cyanosis or clubbing noted to the nails. Lymph: No lymphadenopathy of the neck or supraclavicular regions. Musculoskeletal: Spine ROM normal. Muscular strength intact. Peripheral pulses: radial=4/4, femoral=4/4, dorsalis pedis=4/4,  Neuro: Cranial nerves intact, Gait normal. Reflexes normal and symmetric, with no pathologic reflex noted. No focal weakness. Normal sensation to light touch. Pelvic/Rectal: Exam deferred to OB/GYN    Urine dip reviewed/negative    ASSESSMENT/PLAN:  1. Physical exam, routine  Healthy diet, regular exercise  - POCT Urinalysis no Micro    2. FHx: colon cancer  Screen    3. Paget's carcinoma of the nipple, left [facility])  Seeing breast surgeon in the near future    4. FHx: kidney cancer  Genetic screening    5.  Abdominal discomfort in right upper quadrant  Abdominal ultrasound  Normal CT abdomen August 2018 was normal  - US Abdomen Complete;  Future      Follow-up regularly or as needed

## 2019-06-07 ENCOUNTER — OFFICE VISIT (OUTPATIENT)
Dept: FAMILY MEDICINE CLINIC | Age: 54
End: 2019-06-07
Payer: COMMERCIAL

## 2019-06-07 VITALS
SYSTOLIC BLOOD PRESSURE: 120 MMHG | HEART RATE: 75 BPM | HEIGHT: 61 IN | OXYGEN SATURATION: 98 % | BODY MASS INDEX: 27.75 KG/M2 | WEIGHT: 147 LBS | DIASTOLIC BLOOD PRESSURE: 80 MMHG

## 2019-06-07 DIAGNOSIS — Z00.00 PHYSICAL EXAM, ROUTINE: Primary | ICD-10-CM

## 2019-06-07 DIAGNOSIS — R10.11 ABDOMINAL DISCOMFORT IN RIGHT UPPER QUADRANT: ICD-10-CM

## 2019-06-07 DIAGNOSIS — Z80.0 FHX: COLON CANCER: ICD-10-CM

## 2019-06-07 DIAGNOSIS — Z80.51 FHX: KIDNEY CANCER: ICD-10-CM

## 2019-06-07 DIAGNOSIS — C50.012 PAGET'S CARCINOMA OF THE NIPPLE, LEFT (HCC): ICD-10-CM

## 2019-06-07 LAB
BILIRUBIN, POC: NORMAL
BLOOD URINE, POC: NORMAL
CLARITY, POC: CLEAR
COLOR, POC: YELLOW
GLUCOSE URINE, POC: NORMAL
KETONES, POC: NORMAL
LEUKOCYTE EST, POC: NORMAL
NITRITE, POC: NORMAL
PH, POC: 6.5
PROTEIN, POC: NORMAL
SPECIFIC GRAVITY, POC: 1
UROBILINOGEN, POC: 0.2

## 2019-06-07 PROCEDURE — 99396 PREV VISIT EST AGE 40-64: CPT | Performed by: FAMILY MEDICINE

## 2019-06-07 PROCEDURE — 81002 URINALYSIS NONAUTO W/O SCOPE: CPT | Performed by: FAMILY MEDICINE

## 2019-06-24 DIAGNOSIS — R10.9 RIGHT LATERAL ABDOMINAL PAIN: Primary | ICD-10-CM

## 2019-06-27 ENCOUNTER — HOSPITAL ENCOUNTER (OUTPATIENT)
Dept: ULTRASOUND IMAGING | Age: 54
Discharge: HOME | End: 2019-06-27
Payer: COMMERCIAL

## 2019-06-27 ENCOUNTER — HOSPITAL ENCOUNTER (OUTPATIENT)
Dept: MAMMOGRAPHY | Age: 54
Discharge: HOME | End: 2019-06-27
Payer: COMMERCIAL

## 2019-06-27 DIAGNOSIS — R93.89 ABNORMAL MRI: ICD-10-CM

## 2019-06-27 DIAGNOSIS — R92.8 ABNORMAL MAMMOGRAM: ICD-10-CM

## 2019-06-27 PROCEDURE — G0279 TOMOSYNTHESIS, MAMMO: HCPCS

## 2019-06-27 PROCEDURE — 76642 ULTRASOUND BREAST LIMITED: CPT

## 2019-07-25 ENCOUNTER — OFFICE VISIT (OUTPATIENT)
Dept: FAMILY MEDICINE CLINIC | Age: 54
End: 2019-07-25
Payer: COMMERCIAL

## 2019-07-25 VITALS
OXYGEN SATURATION: 99 % | DIASTOLIC BLOOD PRESSURE: 82 MMHG | BODY MASS INDEX: 27.3 KG/M2 | SYSTOLIC BLOOD PRESSURE: 126 MMHG | HEART RATE: 86 BPM | WEIGHT: 144.4 LBS

## 2019-07-25 DIAGNOSIS — F51.02 ADJUSTMENT INSOMNIA: ICD-10-CM

## 2019-07-25 DIAGNOSIS — F43.22 ADJUSTMENT REACTION WITH ANXIETY: Primary | ICD-10-CM

## 2019-07-25 PROCEDURE — 99213 OFFICE O/P EST LOW 20 MIN: CPT | Performed by: NURSE PRACTITIONER

## 2019-07-25 RX ORDER — HYDROXYZINE HYDROCHLORIDE 25 MG/1
25 TABLET, FILM COATED ORAL 3 TIMES DAILY PRN
Qty: 30 TABLET | Refills: 0 | Status: SHIPPED | OUTPATIENT
Start: 2019-07-25 | End: 2019-08-04

## 2019-07-25 RX ORDER — HYDROXYZINE HYDROCHLORIDE 25 MG/1
25 TABLET, FILM COATED ORAL 3 TIMES DAILY PRN
Qty: 30 TABLET | Refills: 0 | Status: SHIPPED | OUTPATIENT
Start: 2019-07-25 | End: 2019-07-25 | Stop reason: SDUPTHER

## 2019-07-25 ASSESSMENT — ENCOUNTER SYMPTOMS: SHORTNESS OF BREATH: 0

## 2019-07-25 NOTE — PATIENT INSTRUCTIONS
Dr. Garcia Expose: [phone]      Patient Education        Anxiety Disorder: Care Instructions  Your Care Instructions    Anxiety is a normal reaction to stress. Difficult situations can cause you to have symptoms such as sweaty palms and a nervous feeling. In an anxiety disorder, the symptoms are far more severe. Constant worry, muscle tension, trouble sleeping, nausea and diarrhea, and other symptoms can make normal daily activities difficult or impossible. These symptoms may occur for no reason, and they can affect your work, school, or social life. Medicines, counseling, and self-care can all help. Follow-up care is a key part of your treatment and safety. Be sure to make and go to all appointments, and call your doctor if you are having problems. It's also a good idea to know your test results and keep a list of the medicines you take. How can you care for yourself at home? · Take medicines exactly as directed. Call your doctor if you think you are having a problem with your medicine. · Go to your counseling sessions and follow-up appointments. · Recognize and accept your anxiety. Then, when you are in a situation that makes you anxious, say to yourself, \"This is not an emergency. I feel uncomfortable, but I am not in danger. I can keep going even if I feel anxious. \"  · Be kind to your body:  ? Relieve tension with exercise or a massage. ? Get enough rest.  ? Avoid alcohol, caffeine, nicotine, and illegal drugs. They can increase your anxiety level and cause sleep problems. ? Learn and do relaxation techniques. See below for more about these techniques. · Engage your mind. Get out and do something you enjoy. Go to a funny movie, or take a walk or hike. Plan your day. Having too much or too little to do can make you anxious. · Keep a record of your symptoms. Discuss your fears with a good friend or family member, or join a support group for people with similar problems.  Talking to others sometimes relieves stress. · Get involved in social groups, or volunteer to help others. Being alone sometimes makes things seem worse than they are. · Get at least 30 minutes of exercise on most days of the week to relieve stress. Walking is a good choice. You also may want to do other activities, such as running, swimming, cycling, or playing tennis or team sports. Relaxation techniques  Do relaxation exercises 10 to 20 minutes a day. You can play soothing, relaxing music while you do them, if you wish. · Tell others in your house that you are going to do your relaxation exercises. Ask them not to disturb you. · Find a comfortable place, away from all distractions and noise. · Lie down on your back, or sit with your back straight. · Focus on your breathing. Make it slow and steady. · Breathe in through your nose. Breathe out through either your nose or mouth. · Breathe deeply, filling up the area between your navel and your rib cage. Breathe so that your belly goes up and down. · Do not hold your breath. · Breathe like this for 5 to 10 minutes. Notice the feeling of calmness throughout your whole body. As you continue to breathe slowly and deeply, relax by doing the following for another 5 to 10 minutes:  · Tighten and relax each muscle group in your body. You can begin at your toes and work your way up to your head. · Imagine your muscle groups relaxing and becoming heavy. · Empty your mind of all thoughts. · Let yourself relax more and more deeply. · Become aware of the state of calmness that surrounds you. · When your relaxation time is over, you can bring yourself back to alertness by moving your fingers and toes and then your hands and feet and then stretching and moving your entire body. Sometimes people fall asleep during relaxation, but they usually wake up shortly afterward.   · Always give yourself time to return to full alertness before you drive a car or do anything that might cause an accident if you are not fully alert. Never play a relaxation tape while you drive a car. When should you call for help? Call 911 anytime you think you may need emergency care. For example, call if:    · You feel you cannot stop from hurting yourself or someone else.   Annie Glance the numbers for these national suicide hotlines: 4-777-909-TALK (8-773.101.9648) and 7-873-NMXVRLM (4-650.934.4210). If you or someone you know talks about suicide or feeling hopeless, get help right away.   Watch closely for changes in your health, and be sure to contact your doctor if:    · You have anxiety or fear that affects your life.     · You have symptoms of anxiety that are new or different from those you had before. Where can you learn more? Go to [email or web address] and sign in to your [employer] account. Enter P754 in the [employer] box to learn more about \"Anxiety Disorder: Care Instructions. \"     If you do not have an account, please click on the \"Sign Up Now\" link. Current as of: September 11, 2018  Content Version: 12.0  © 2921-4673 [employer], [employer]. Care instructions adapted under license by [facility] ([facility]). If you have questions about a medical condition or this instruction, always ask your healthcare professional. [address] any warranty or liability for your use of this information.

## 2019-07-25 NOTE — PROGRESS NOTES
SUBJECTIVE:  Pt is a of 53 y.o. female comes in today with   Chief Complaint   Patient presents with    Anxiety     Pt states she's having hard time sleeping and hard time calming herself down. Patient presenting today for evaluation of anxiety. Present for couple of months and worsening. Feeling nervousness, constant worrying, mind racing, interfering with sleep. Symptoms come and go. Tyl pm helps- but then daytime drowsiness. Denies feeling sad, mood swings, or overwhelmed. Denies SI. States recent health issues and family issues are happening which is causing anxiety. Prior to Visit Medications    Medication Sig Taking? Authorizing Provider   vitamin D (CHOLECALCIFEROL) 1000 UNIT TABS tablet Take 2,000 Units by mouth daily  Yes Historical Provider, MD         Patient's allergies, past medical, surgical, social and family histories werereviewed and updated as appropriate. Review of Systems   Constitutional: Positive for fatigue. Respiratory: Negative for shortness of breath. Cardiovascular: Negative for chest pain and palpitations. Psychiatric/Behavioral: Positive for sleep disturbance. Negative for agitation, behavioral problems and suicidal ideas. The patient is nervous/anxious. Physical Exam   Constitutional: She is oriented to person, place, and time. She appears well-developed and well-nourished. Cardiovascular: Normal rate, regular rhythm and normal heart sounds. No murmur heard. Pulmonary/Chest: Effort normal and breath sounds normal.   Neurological: She is alert and oriented to person, place, and time. Psychiatric: She has a normal mood and affect. Her behavior is normal. Judgment and thought content normal.   Vitals reviewed. Vitals:    07/25/19 1414   BP: 126/82   Pulse: 86   SpO2: 99%   Weight: 144 lb 6.4 oz (65.5 kg)             ASSESSMENT:  1. Adjustment reaction with anxiety    2.  Adjustment insomnia        PLAN:  Adjustment reaction with anxiety  - hydrOXYzine (ATARAX) 25 MG tablet; Take 1 tablet by mouth 3 times daily as needed for Anxiety (insomnia)  -     Ambulatory referral to Psychology    Adjustment insomnia  -     hydrOXYzine (ATARAX) 25 MG tablet; Take 1 tablet by mouth 3 times daily as needed for Anxiety (insomnia)  -     Ambulatory referral to Psychology  See pt instructions  F/u prn  Discussed use, benefit, and side effects of prescribed medications. All patient questions answered. Pt voiced understanding.

## 2019-11-13 ENCOUNTER — OFFICE VISIT (OUTPATIENT)
Dept: FAMILY MEDICINE CLINIC | Age: 54
End: 2019-11-13
Payer: COMMERCIAL

## 2019-11-13 VITALS
HEART RATE: 76 BPM | BODY MASS INDEX: 28.86 KG/M2 | HEIGHT: 60 IN | TEMPERATURE: 99.1 F | SYSTOLIC BLOOD PRESSURE: 110 MMHG | DIASTOLIC BLOOD PRESSURE: 80 MMHG | OXYGEN SATURATION: 97 % | WEIGHT: 147 LBS

## 2019-11-13 DIAGNOSIS — R07.89 CHEST PAIN, ATYPICAL: ICD-10-CM

## 2019-11-13 DIAGNOSIS — R10.9 ABDOMINAL PAIN, UNSPECIFIED ABDOMINAL LOCATION: Primary | ICD-10-CM

## 2019-11-13 DIAGNOSIS — K21.9 GASTROESOPHAGEAL REFLUX DISEASE, ESOPHAGITIS PRESENCE NOT SPECIFIED: ICD-10-CM

## 2019-11-13 DIAGNOSIS — Z23 NEED FOR VACCINATION: ICD-10-CM

## 2019-11-13 LAB
BACTERIA URINE, POC: NORMAL
BILIRUBIN URINE: 0 MG/DL
BLOOD, URINE: NEGATIVE
CASTS URINE, POC: NORMAL
CLARITY: CLEAR
COLOR: NORMAL
CRYSTALS URINE, POC: NORMAL
EPI CELLS URINE, POC: NORMAL
GLUCOSE URINE: NORMAL
KETONES, URINE: NEGATIVE
LEUKOCYTE EST, POC: NORMAL
NITRITE, URINE: NEGATIVE
PH UA: 7.5 (ref 4.5–8)
PROTEIN UA: NEGATIVE
RBC URINE, POC: NORMAL
SPECIFIC GRAVITY UA: 1.01 (ref 1–1.03)
UROBILINOGEN, URINE: NORMAL
WBC URINE, POC: NORMAL
YEAST URINE, POC: NORMAL

## 2019-11-13 PROCEDURE — 90686 IIV4 VACC NO PRSV 0.5 ML IM: CPT | Performed by: FAMILY MEDICINE

## 2019-11-13 PROCEDURE — 99214 OFFICE O/P EST MOD 30 MIN: CPT | Performed by: FAMILY MEDICINE

## 2019-11-13 PROCEDURE — 81000 URINALYSIS NONAUTO W/SCOPE: CPT | Performed by: FAMILY MEDICINE

## 2019-11-13 PROCEDURE — 90471 IMMUNIZATION ADMIN: CPT | Performed by: FAMILY MEDICINE

## 2019-11-13 RX ORDER — FAMOTIDINE 20 MG/1
20 TABLET, FILM COATED ORAL 2 TIMES DAILY PRN
Qty: 60 TABLET | Refills: 1 | Status: SHIPPED | OUTPATIENT
Start: 2019-11-13 | End: 2021-08-04

## 2019-11-14 LAB — URINE CULTURE, ROUTINE: NORMAL

## 2019-11-25 ENCOUNTER — HOSPITAL ENCOUNTER (OUTPATIENT)
Dept: NON INVASIVE DIAGNOSTICS | Age: 54
Discharge: HOME | End: 2019-11-25
Payer: COMMERCIAL

## 2019-11-25 DIAGNOSIS — R07.89 CHEST PAIN, ATYPICAL: ICD-10-CM

## 2019-11-25 LAB
LEFT VENTRICULAR EJECTION FRACTION HIGH VALUE: 55 %
LEFT VENTRICULAR EJECTION FRACTION MODE: NORMAL
LV EF: 50 %
LVEF MODALITY: NORMAL

## 2019-11-25 PROCEDURE — 93351 STRESS TTE COMPLETE: CPT

## 2019-11-25 PROCEDURE — 93320 DOPPLER ECHO COMPLETE: CPT

## 2019-12-26 ENCOUNTER — OFFICE VISIT (OUTPATIENT)
Dept: FAMILY MEDICINE CLINIC | Age: 54
End: 2019-12-26
Payer: COMMERCIAL

## 2019-12-26 VITALS
WEIGHT: 148.4 LBS | DIASTOLIC BLOOD PRESSURE: 84 MMHG | OXYGEN SATURATION: 98 % | SYSTOLIC BLOOD PRESSURE: 132 MMHG | HEART RATE: 81 BPM | BODY MASS INDEX: 28.98 KG/M2

## 2019-12-26 DIAGNOSIS — L30.9 DERMATITIS: Primary | ICD-10-CM

## 2019-12-26 PROCEDURE — 99213 OFFICE O/P EST LOW 20 MIN: CPT | Performed by: NURSE PRACTITIONER

## 2019-12-26 RX ORDER — PREDNISONE 20 MG/1
TABLET ORAL
Qty: 20 TABLET | Refills: 0 | Status: SHIPPED | OUTPATIENT
Start: 2019-12-26 | End: 2020-08-03

## 2019-12-26 RX ORDER — VALACYCLOVIR HYDROCHLORIDE 500 MG/1
500 TABLET, FILM COATED ORAL 3 TIMES DAILY
Qty: 21 TABLET | Refills: 0 | Status: SHIPPED | OUTPATIENT
Start: 2019-12-26 | End: 2020-01-02

## 2020-03-25 ENCOUNTER — TELEPHONE (OUTPATIENT)
Dept: FAMILY MEDICINE CLINIC | Age: 55
End: 2020-03-25

## 2020-03-25 RX ORDER — PREDNISONE 20 MG/1
40 TABLET ORAL DAILY
Qty: 14 TABLET | Refills: 0 | Status: SHIPPED | OUTPATIENT
Start: 2020-03-25 | End: 2020-04-01

## 2020-03-25 RX ORDER — FLUTICASONE PROPIONATE 50 MCG
2 SPRAY, SUSPENSION (ML) NASAL DAILY
Qty: 1 BOTTLE | Refills: 0 | Status: SHIPPED | OUTPATIENT
Start: 2020-03-25

## 2020-03-25 NOTE — TELEPHONE ENCOUNTER
Fever: 100.4 started Friday - no cough or runny nose.   Saturday eyes hurt to move, migraine, sinus pressure  And since Monday has not been able to smell or taste

## 2020-07-07 ENCOUNTER — TELEPHONE (OUTPATIENT)
Dept: FAMILY MEDICINE CLINIC | Age: 55
End: 2020-07-07

## 2020-07-07 NOTE — TELEPHONE ENCOUNTER
The following patient is requesting a new patient appointment    With Provider: Salma Campbell    Insurance: BC    Medications / Conditions / Complaint : NTP  Physical,    Preferred Days: Anytime    Preferred Time: Morning    Best Contact Number:[phone]

## 2020-07-16 ENCOUNTER — HOSPITAL ENCOUNTER (OUTPATIENT)
Dept: MAMMOGRAPHY | Age: 55
Discharge: HOME | End: 2020-07-16
Payer: COMMERCIAL

## 2020-07-16 PROCEDURE — G0279 TOMOSYNTHESIS, MAMMO: HCPCS

## 2020-07-23 ENCOUNTER — TELEPHONE (OUTPATIENT)
Dept: FAMILY MEDICINE CLINIC | Age: 55
End: 2020-07-23

## 2020-07-23 NOTE — TELEPHONE ENCOUNTER
----- Message from Long Robertson sent at 7/23/2020  9:32 AM EDT -----  Subject: Message to Provider    QUESTIONS  Information for Provider? Pt is needing blood work orders for her physical   8/3/20. Pt would also like to know if she can get a COVID-19 Anti-Body   test done as well?  ---------------------------------------------------------------------------  --------------  CALL BACK INFO  What is the best way for the office to contact you? OK to leave message on   voicemail  Preferred Call Back Phone Number? [phone]  ---------------------------------------------------------------------------  --------------  SCRIPT ANSWERS  Relationship to Patient?  Self

## 2020-07-23 NOTE — TELEPHONE ENCOUNTER
----- Message from Eliecer Jeffrey sent at 7/23/2020  9:32 AM EDT -----  Subject: Message to Provider    QUESTIONS  Information for Provider? Pt is needing blood work orders for her physical   8/3/20. Pt would also like to know if she can get a COVID-19 Anti-Body   test done as well?  ---------------------------------------------------------------------------  --------------  CALL BACK INFO  What is the best way for the office to contact you? OK to leave message on   voicemail  Preferred Call Back Phone Number? [phone]  ---------------------------------------------------------------------------  --------------  SCRIPT ANSWERS  Relationship to Patient?  Self

## 2020-07-27 DIAGNOSIS — Z80.0 FHX: COLON CANCER: ICD-10-CM

## 2020-07-27 DIAGNOSIS — Z20.822 EXPOSURE TO COVID-19 VIRUS: ICD-10-CM

## 2020-07-27 DIAGNOSIS — Z00.00 PHYSICAL EXAM, ROUTINE: ICD-10-CM

## 2020-07-27 LAB
A/G RATIO: 1.7 (ref 1.1–2.2)
ALBUMIN SERPL-MCNC: 4.7 G/DL (ref 3.4–5)
ALP BLD-CCNC: 79 U/L (ref 40–129)
ALT SERPL-CCNC: 19 U/L (ref 10–40)
ANION GAP SERPL CALCULATED.3IONS-SCNC: 12 MMOL/L (ref 3–16)
AST SERPL-CCNC: 21 U/L (ref 15–37)
BASOPHILS ABSOLUTE: 0 K/UL (ref 0–0.2)
BASOPHILS RELATIVE PERCENT: 0.7 %
BILIRUB SERPL-MCNC: 0.4 MG/DL (ref 0–1)
BUN BLDV-MCNC: 14 MG/DL (ref 7–20)
CALCIUM SERPL-MCNC: 10.2 MG/DL (ref 8.3–10.6)
CHLORIDE BLD-SCNC: 102 MMOL/L (ref 99–110)
CHOLESTEROL, TOTAL: 223 MG/DL (ref 0–199)
CO2: 26 MMOL/L (ref 21–32)
CREAT SERPL-MCNC: 0.8 MG/DL (ref 0.6–1.1)
EOSINOPHILS ABSOLUTE: 0.2 K/UL (ref 0–0.6)
EOSINOPHILS RELATIVE PERCENT: 4.2 %
GFR AFRICAN AMERICAN: >60
GFR NON-AFRICAN AMERICAN: >60
GLOBULIN: 2.7 G/DL
GLUCOSE BLD-MCNC: 96 MG/DL (ref 70–99)
HCT VFR BLD CALC: 43 % (ref 36–48)
HDLC SERPL-MCNC: 57 MG/DL (ref 40–60)
HEMOGLOBIN: 14.3 G/DL (ref 12–16)
LDL CHOLESTEROL CALCULATED: 132 MG/DL
LYMPHOCYTES ABSOLUTE: 1.9 K/UL (ref 1–5.1)
LYMPHOCYTES RELATIVE PERCENT: 33.9 %
MCH RBC QN AUTO: 31.8 PG (ref 26–34)
MCHC RBC AUTO-ENTMCNC: 33.4 G/DL (ref 31–36)
MCV RBC AUTO: 95.2 FL (ref 80–100)
MONOCYTES ABSOLUTE: 0.6 K/UL (ref 0–1.3)
MONOCYTES RELATIVE PERCENT: 10.4 %
NEUTROPHILS ABSOLUTE: 2.8 K/UL (ref 1.7–7.7)
NEUTROPHILS RELATIVE PERCENT: 50.8 %
PDW BLD-RTO: 12.4 % (ref 12.4–15.4)
PLATELET # BLD: 274 K/UL (ref 135–450)
PMV BLD AUTO: 8.3 FL (ref 5–10.5)
POTASSIUM SERPL-SCNC: 4.6 MMOL/L (ref 3.5–5.1)
RBC # BLD: 4.51 M/UL (ref 4–5.2)
SARS-COV-2 ANTIBODY, TOTAL: POSITIVE
SODIUM BLD-SCNC: 140 MMOL/L (ref 136–145)
TOTAL PROTEIN: 7.4 G/DL (ref 6.4–8.2)
TRIGL SERPL-MCNC: 172 MG/DL (ref 0–150)
TSH REFLEX: 2.03 UIU/ML (ref 0.27–4.2)
VITAMIN D 25-HYDROXY: 32.7 NG/ML
VLDLC SERPL CALC-MCNC: 34 MG/DL
WBC # BLD: 5.5 K/UL (ref 4–11)

## 2020-07-28 LAB
ESTIMATED AVERAGE GLUCOSE: 108.3 MG/DL
HBA1C MFR BLD: 5.4 %

## 2020-08-03 ENCOUNTER — OFFICE VISIT (OUTPATIENT)
Dept: FAMILY MEDICINE CLINIC | Age: 55
End: 2020-08-03
Payer: COMMERCIAL

## 2020-08-03 VITALS
OXYGEN SATURATION: 99 % | DIASTOLIC BLOOD PRESSURE: 78 MMHG | HEART RATE: 80 BPM | SYSTOLIC BLOOD PRESSURE: 118 MMHG | TEMPERATURE: 98.1 F | BODY MASS INDEX: 29.84 KG/M2 | WEIGHT: 152.8 LBS

## 2020-08-03 LAB
BILIRUBIN, POC: NEGATIVE
BLOOD URINE, POC: NEGATIVE
CLARITY, POC: CLEAR
COLOR, POC: YELLOW
GLUCOSE URINE, POC: NEGATIVE
KETONES, POC: NEGATIVE
LEUKOCYTE EST, POC: NORMAL
NITRITE, POC: NEGATIVE
PH, POC: 7
PROTEIN, POC: NEGATIVE
SPECIFIC GRAVITY, POC: 1.01
UROBILINOGEN, POC: 0.2

## 2020-08-03 PROCEDURE — 81002 URINALYSIS NONAUTO W/O SCOPE: CPT | Performed by: FAMILY MEDICINE

## 2020-08-03 PROCEDURE — 99396 PREV VISIT EST AGE 40-64: CPT | Performed by: FAMILY MEDICINE

## 2020-08-03 ASSESSMENT — PATIENT HEALTH QUESTIONNAIRE - PHQ9
SUM OF ALL RESPONSES TO PHQ9 QUESTIONS 1 & 2: 0
SUM OF ALL RESPONSES TO PHQ QUESTIONS 1-9: 0
2. FEELING DOWN, DEPRESSED OR HOPELESS: 0
1. LITTLE INTEREST OR PLEASURE IN DOING THINGS: 0
SUM OF ALL RESPONSES TO PHQ QUESTIONS 1-9: 0

## 2020-08-03 NOTE — PROGRESS NOTES
None    Transportation needs     Medical: None     Non-medical: None   Tobacco Use    Smoking status: Never Smoker    Smokeless tobacco: Never Used    Tobacco comment: college days   Substance and Sexual Activity    Alcohol use: Yes     Comment: occ    Drug use: No    Sexual activity: Yes     Partners: Male   Lifestyle    Physical activity     Days per week: None     Minutes per session: None    Stress: None   Relationships    Social connections     Talks on phone: None     Gets together: None     Attends Hoahaoism service: None     Active member of club or organization: None     Attends meetings of clubs or organizations: None     Relationship status: None    Intimate partner violence     Fear of current or ex partner: None     Emotionally abused: None     Physically abused: None     Forced sexual activity: None   Other Topics Concern    None   Social History Narrative    None     Family History   Problem Relation Age of Onset    Lupus Mother     Cancer Mother 79        colon cancer    Cancer Father 70        renal cancer    Cancer Paternal Grandfather         Throat    Hypertension Maternal Grandmother     Coronary Art Dis Maternal Grandmother     Ovarian Cancer Paternal Grandmother                              REVIEW OF SYSTEMS:   CONSTITUTIONAL: No major weight gain or loss, fatigue, weakness, night sweats or fever. HEENT: No new vision difficulties or ringing in the ears. RESPIRATORY: No new SOB, PND, orthopnea or cough. CARDIOVASCULAR: no CP, palpitations or SOB with exertion  GI: No nausea, vomiting, diarrhea, constipation, abdominal pain or changes in bowel habits. : No urinary frequency, urgency, incontinence hematuria or dysuria. SKIN: No cyanosis or skin lesions. MUSCULOSKELETAL: No new muscle or joint pain. NEUROLOGICAL: No syncope or TIA-like symptoms.    PSYCHIATRIC: No anxiety, insomnia or depression     PHYSICAL EXAMINATION:  /78   Pulse 80   Temp 98.1 °F [facility])  Sees cancer specialist    3. FHx: kidney cancer  Genetic screening    4. FHx: colon cancer  Genetic screening    5. Elevated LDL cholesterol level  Screen  - CT CARDIAC CALCIUM SCORING;  Future

## 2020-08-05 LAB
HPV COMMENT: NORMAL
HPV TYPE 16: NOT DETECTED
HPV TYPE 18: NOT DETECTED
HPVOH (OTHER TYPES): NOT DETECTED

## 2020-08-10 ENCOUNTER — HOSPITAL ENCOUNTER (OUTPATIENT)
Dept: CT IMAGING | Age: 55
Discharge: HOME | End: 2020-08-10
Payer: COMMERCIAL

## 2020-08-10 PROCEDURE — 75571 CT HRT W/O DYE W/CA TEST: CPT

## 2020-09-16 ENCOUNTER — TELEPHONE (OUTPATIENT)
Dept: FAMILY MEDICINE CLINIC | Age: 55
End: 2020-09-16

## 2020-09-16 NOTE — TELEPHONE ENCOUNTER
----- Message from Nigel Tamayo sent at 9/16/2020  4:56 PM EDT -----  Subject: Appointment Request    Reason for Call: Routine (Patient Request) No Script    QUESTIONS  Type of Appointment? Established Patient  Reason for appointment request? Available appointments did not meet   patient need  Additional Information for Provider? patient wanted an in person and none   were available in the near future to suite patient. worries of an ear   infection is starting warm to touch and is hearing a light shifting noise   when in a quiet space. no pain or drainage  ---------------------------------------------------------------------------  --------------  CALL BACK INFO  What is the best way for the office to contact you? OK to leave message on   voicemail  Preferred Call Back Phone Number? [phone]  ---------------------------------------------------------------------------  --------------  SCRIPT ANSWERS  Relationship to Patient? Self  Appointment reason? Symptomatic  Select script based on patient symptoms? Adult No Script  (Patient requests to see the provider urgently  today or tomorrow. )? No  (Is the patient requesting to see the provider for a procedure?)? No  (Is the patient requesting to be seen routinely (not today or tomorrow)? Yes  Have you been diagnosed with   tested for   or told that you are suspected of having COVID-19 (Coronavirus)? No  Have you had a fever or taken medication to treat a fever within the past   3 days? No  Have you had a cough   shortness of breath or flu-like symptoms within the past 3 days? No  Do you currently have flu-like symptoms including fever or chills   cough   shortness of breath   or difficulty breathing   or new loss of taste or smell? No  (Service Expert  click yes below to proceed with [employer] As Usual   Scheduling)?  Yes

## 2020-09-17 ENCOUNTER — OFFICE VISIT (OUTPATIENT)
Dept: FAMILY MEDICINE CLINIC | Age: 55
End: 2020-09-17
Payer: COMMERCIAL

## 2020-09-17 VITALS
BODY MASS INDEX: 29.06 KG/M2 | OXYGEN SATURATION: 98 % | HEIGHT: 60 IN | SYSTOLIC BLOOD PRESSURE: 110 MMHG | HEART RATE: 85 BPM | DIASTOLIC BLOOD PRESSURE: 74 MMHG | WEIGHT: 148 LBS | TEMPERATURE: 98.1 F

## 2020-09-17 PROCEDURE — 99213 OFFICE O/P EST LOW 20 MIN: CPT | Performed by: FAMILY MEDICINE

## 2020-09-17 NOTE — PROGRESS NOTES
Tiffanie Benitez is a 63 y.o. female. HPI:  Noise in left ear worse than right  Feels a pulse in her left ear, was keeping her awake the other night  Denies TMJ, clenching or grinding  Using Flonase, as needed, recommended she use it more regularly    Meds, vitamins and allergies reviewed with pt    Wt Readings from Last 3 Encounters:   09/17/20 148 lb (67.1 kg)   08/03/20 152 lb 12.8 oz (69.3 kg)   12/26/19 148 lb 6.4 oz (67.3 kg)       REVIEW OF SYSTEMS:   CONSTITUTIONAL: See history of present illness,   Weight noted   HEENT: No new vision difficulties , see HPI  RESPIRATORY: No new SOB, PND, orthopnea or cough. CARDIOVASCULAR: no CP, palpitations or SOB with exertion  GI: No nausea, vomiting, diarrhea, constipation, abdominal pain or changes in bowel habits. : No urinary frequency, urgency, incontinence hematuria or dysuria. SKIN: No cyanosis or skin lesions. MUSCULOSKELETAL: No new muscle or joint pain. NEUROLOGICAL: No syncope or TIA-like symptoms. PSYCHIATRIC: No anxiety, insomnia or depression     No Known Allergies    Prior to Visit Medications    Medication Sig Taking?  Authorizing Provider   fluticasone (FLONASE) 50 MCG/ACT nasal spray 2 sprays by Nasal route daily Yes Lobito Marrufo MD   famotidine (PEPCID) 20 MG tablet Take 1 tablet by mouth 2 times daily as needed (reflux) Yes Lobito Marrufo MD   vitamin D (CHOLECALCIFEROL) 1000 UNIT TABS tablet Take 2,000 Units by mouth daily  Yes Historical Provider, MD       Past Medical History:   Diagnosis Date    Incomplete RBBB     Paget disease of breast, left ([address]        Social History     Tobacco Use    Smoking status: Never Smoker    Smokeless tobacco: Never Used    Tobacco comment: college days   Substance Use Topics    Alcohol use: Yes     Comment: occ       Family History   Problem Relation Age of Onset    Lupus Mother     Cancer Mother 79        colon cancer    Cancer Father 70        renal cancer    Cancer Paternal Grandfather         Throat    Hypertension Maternal Grandmother     Coronary Art Dis Maternal Grandmother     Ovarian Cancer Paternal Grandmother        OBJECTIVE:  /74   Pulse 85   Temp 98.1 °F (36.7 °C)   Ht 5' (1.524 m)   Wt 148 lb (67.1 kg)   LMP 08/03/2013 (Exact Date)   SpO2 98%   BMI 28.90 kg/m²   GEN:  in NAD  HEENT:  NCAT, TMs:normal, no cerumen seen  NECK:  Supple without adenopathy. CV:  Regular rate and rhythm, S1 and S2 normal, no murmurs, clicks  PULM:  Chest is clear, no wheezing ,  symmetric air entry throughout both lung fields. ABD: Soft, NT, no masses appreciated  EXT: No rash or edema  NEURO: Alert oriented ×3, nonfocal , no assistive device    ASSESSMENT/PLAN:  1. Hearing difficulty of left ear  Refer to audiology  - [place], [place]. [place], Audiology, [facility]    Flonase daily  Flu vaccine October  Second shingles vaccine 2 to 6 months after first

## 2020-10-19 ENCOUNTER — OFFICE VISIT (OUTPATIENT)
Dept: AUDIOLOGY | Age: 55
End: 2020-10-19
Payer: COMMERCIAL

## 2020-10-19 PROCEDURE — 92557 COMPREHENSIVE HEARING TEST: CPT | Performed by: AUDIOLOGIST

## 2020-10-19 PROCEDURE — 92550 TYMPANOMETRY & REFLEX THRESH: CPT | Performed by: AUDIOLOGIST

## 2020-10-19 NOTE — PROGRESS NOTES
AUDIOLOGIC AND OTHER PERTINENT MEDICAL HISTORY:      Salvador Rodriguez was seen for hearing and middle ear pressure evals. PCP (Dr Nataly Galeano) is referral source of todays appointment. Patient presents with risk factors of hearing loss, including radiation treatments and renal complications. She presents with left sided tinnitus concerns in which she describes as \"mimicking her heartbeat. ASSESSMENT AND FINDINGS:     Audiogram demonstrates essentially normal hearing from left ear. Right ear ranges from normal-marginal loss of conductive nature. Loss is asymmetrical and of sensory nature. Patient demonstrated good speech understanding in quiet, at elevated levels. Immittance/Middle ear assessment:   Type A curve from both ears, consistent with normal compliance, and pressure, with increased resistance from both sides. Ipsilateral acoustic reflexes were absent-consistent with degree of hearing loss. Chart cc'd to: Marci Rosa MD      Degree of   Hearing Sensitivity dB Range   Within Normal Limits (WNL) 0 - 20   Mild 20 - 40   Moderate 40 - 55   Moderately-Severe 55 - 70   Severe 70 - 90   Profound 90 +       RECOMMENDATIONS:                                                                 1. Hearing and tinnitus risk factors reviewed and effects explained to patient. 2. Patient should return annually to monitor hearing levels. Hearing and tinnitus are non-debilitating at this time.

## 2021-01-19 ENCOUNTER — OFFICE VISIT (OUTPATIENT)
Dept: FAMILY MEDICINE CLINIC | Age: 56
End: 2021-01-19
Payer: COMMERCIAL

## 2021-01-19 ENCOUNTER — TELEPHONE (OUTPATIENT)
Dept: FAMILY MEDICINE CLINIC | Age: 56
End: 2021-01-19

## 2021-01-19 VITALS
HEART RATE: 94 BPM | DIASTOLIC BLOOD PRESSURE: 76 MMHG | WEIGHT: 143.4 LBS | BODY MASS INDEX: 27.08 KG/M2 | OXYGEN SATURATION: 99 % | TEMPERATURE: 98.1 F | HEIGHT: 61 IN | SYSTOLIC BLOOD PRESSURE: 112 MMHG

## 2021-01-19 DIAGNOSIS — I89.1 HERPETIC WHITLOW OF FINGER OF RIGHT HAND WITH LYMPHANGITIS: Primary | ICD-10-CM

## 2021-01-19 DIAGNOSIS — B00.89 HERPETIC WHITLOW OF FINGER OF RIGHT HAND WITH LYMPHANGITIS: Primary | ICD-10-CM

## 2021-01-19 PROCEDURE — 99213 OFFICE O/P EST LOW 20 MIN: CPT | Performed by: FAMILY MEDICINE

## 2021-01-19 RX ORDER — CEPHALEXIN 500 MG/1
500 CAPSULE ORAL 3 TIMES DAILY
Qty: 21 CAPSULE | Refills: 0 | Status: SHIPPED | OUTPATIENT
Start: 2021-01-19 | End: 2021-01-26

## 2021-01-19 RX ORDER — VALACYCLOVIR HYDROCHLORIDE 1 G/1
1000 TABLET, FILM COATED ORAL 3 TIMES DAILY
Qty: 21 TABLET | Refills: 0 | Status: SHIPPED | OUTPATIENT
Start: 2021-01-19 | End: 2021-08-04

## 2021-01-19 ASSESSMENT — PATIENT HEALTH QUESTIONNAIRE - PHQ9
SUM OF ALL RESPONSES TO PHQ QUESTIONS 1-9: 0
SUM OF ALL RESPONSES TO PHQ QUESTIONS 1-9: 0

## 2021-01-19 NOTE — PROGRESS NOTES
Tam Warner is a 61 y.o. female. HPI:  Here to check rash at base of her right pointer finger  Feels discomfort upper forearm    Vesicular rash, looks like herpetic jax  Years ago had episode of arm swelling and at least once a year she breaks out in this rash    No history of cold sores  Denies fever or  chills    Meds, vitamins and allergies reviewed with pt  Wt Readings from Last 3 Encounters:   01/19/21 143 lb 6.4 oz (65 kg)   09/17/20 148 lb (67.1 kg)   08/03/20 152 lb 12.8 oz (69.3 kg)       REVIEW OF SYSTEMS:   CONSTITUTIONAL: See history of present illness,   Weight noted   HEENT: No new vision difficulties or ringing in the ears. RESPIRATORY: No new SOB, PND, orthopnea or cough. CARDIOVASCULAR: no CP, palpitations or SOB with exertion  GI: No nausea, vomiting, diarrhea, constipation, abdominal pain or changes in bowel habits. : No urinary frequency, urgency, incontinence hematuria or dysuria. SKIN: No cyanosis or skin lesions. MUSCULOSKELETAL: No new muscle or joint pain. NEUROLOGICAL: No syncope or TIA-like symptoms. PSYCHIATRIC: No anxiety, insomnia or depression     No Known Allergies    Prior to Visit Medications    Medication Sig Taking?  Authorizing Provider   valACYclovir (VALTREX) 1 g tablet Take 1 tablet by mouth 3 times daily Yes Malachi Gorman MD   cephALEXin (KEFLEX) 500 MG capsule Take 1 capsule by mouth 3 times daily for 7 days Yes Malachi Gorman MD   fluticasone (FLONASE) 50 MCG/ACT nasal spray 2 sprays by Nasal route daily Yes Malachi Gorman MD   famotidine (PEPCID) 20 MG tablet Take 1 tablet by mouth 2 times daily as needed (reflux) Yes Malachi Gorman MD   vitamin D (CHOLECALCIFEROL) 1000 UNIT TABS tablet Take 2,000 Units by mouth daily  Yes Historical Provider, MD       Past Medical History:   Diagnosis Date    Incomplete RBBB     Paget disease of breast, left ([address]        Social History     Tobacco Use    Smoking status: Never Smoker  Smokeless tobacco: Never Used    Tobacco comment: college days   Substance Use Topics    Alcohol use: Yes     Comment: occ       Family History   Problem Relation Age of Onset    Lupus Mother     Cancer Mother 79        colon cancer    Cancer Father 70        renal cancer    Cancer Paternal Grandfather         Throat    Hypertension Maternal Grandmother     Coronary Art Dis Maternal Grandmother     Ovarian Cancer Paternal Grandmother        OBJECTIVE:  /76   Pulse 94   Temp 98.1 °F (36.7 °C)   Ht 5' 1\" (1.549 m)   Wt 143 lb 6.4 oz (65 kg)   LMP 08/03/2013 (Exact Date)   SpO2 99%   BMI 27.10 kg/m²   GEN:  in NAD  HEENT:  NCAT, TMs:normal and throat: not Examined due to Covid/mask on  NECK:  Supple without adenopathy. CV:  Regular rate and rhythm, S1 and S2 normal, no murmurs, clicks  PULM:  Chest is clear, no wheezing ,  symmetric air entry throughout both lung fields. ABD: Soft, NT  EXT: Right pointer finger with vesicular rash base and medial, lightly red and tender, somewhat fluctuant, forearm with streaky redness with mild discomfort  Good radial pulse    NEURO: Alert oriented ×3, nonfocal , no assistive device    ASSESSMENT/PLAN:  1.  Herpetic jax of finger of right hand with lymphangitis  Ice and elevate  Tonight Tylenol with Advil  Valtrex and cephalexin  MyChart message in 2 -3 days with progress report

## 2021-02-24 LAB
IGA: 153 MG/DL (ref 70–400)
TISSUE TRANSGLUTAMINASE IGA: <0.5 U/ML (ref 0–14)
TSH SERPL DL<=0.05 MIU/L-ACNC: 1.77 UIU/ML (ref 0.27–4.2)

## 2021-05-18 ENCOUNTER — TELEPHONE (OUTPATIENT)
Dept: FAMILY MEDICINE CLINIC | Age: 56
End: 2021-05-18

## 2021-06-16 ENCOUNTER — TELEPHONE (OUTPATIENT)
Dept: FAMILY MEDICINE CLINIC | Age: 56
End: 2021-06-16

## 2021-06-16 DIAGNOSIS — Z00.00 PHYSICAL EXAM, ROUTINE: Primary | ICD-10-CM

## 2021-06-16 NOTE — TELEPHONE ENCOUNTER
----- Message from [facility] sent at 6/16/2021  3:31 PM EDT -----  Subject: Referral Request    QUESTIONS   Reason for referral request? Pt is scheduled for her annual physical. she   just wants the orders for the typical blood tests put in so she can have   them done before the apt. She also wants a test ordered for covid 19   antibodies. Her apt is 8/4/21. Has the physician seen you for this condition before? No   Preferred Specialist (if applicable)? Do you already have an appointment scheduled? Yes  Select Scheduled Date? 2021-08-04  Select Scheduled Physician? Po Campos   Additional Information for Provider?   ---------------------------------------------------------------------------  --------------  Chloe TEJADA  What is the best way for the office to contact you? OK to leave message on   voicemail  Preferred Call Back Phone Number?  [phone]

## 2021-07-22 ENCOUNTER — HOSPITAL ENCOUNTER (OUTPATIENT)
Dept: MAMMOGRAPHY | Age: 56
Discharge: HOME | End: 2021-07-22
Payer: COMMERCIAL

## 2021-07-22 VITALS — BODY MASS INDEX: 27.38 KG/M2 | WEIGHT: 145 LBS | HEIGHT: 61 IN

## 2021-07-22 DIAGNOSIS — N60.19 FIBROCYSTIC BREAST DISEASE (FCBD), UNSPECIFIED LATERALITY: ICD-10-CM

## 2021-07-22 DIAGNOSIS — Z85.3 PERSONAL HISTORY OF BREAST CANCER: ICD-10-CM

## 2021-07-22 PROCEDURE — G0279 TOMOSYNTHESIS, MAMMO: HCPCS

## 2021-07-27 DIAGNOSIS — Z00.00 PHYSICAL EXAM, ROUTINE: ICD-10-CM

## 2021-07-27 LAB
A/G RATIO: 2 (ref 1.1–2.2)
ALBUMIN SERPL-MCNC: 4.8 G/DL (ref 3.4–5)
ALP BLD-CCNC: 81 U/L (ref 40–129)
ALT SERPL-CCNC: 23 U/L (ref 10–40)
ANION GAP SERPL CALCULATED.3IONS-SCNC: 10 MMOL/L (ref 3–16)
AST SERPL-CCNC: 25 U/L (ref 15–37)
BASOPHILS ABSOLUTE: 0 K/UL (ref 0–0.2)
BASOPHILS RELATIVE PERCENT: 0.9 %
BILIRUB SERPL-MCNC: 0.5 MG/DL (ref 0–1)
BUN BLDV-MCNC: 12 MG/DL (ref 7–20)
CALCIUM SERPL-MCNC: 9.9 MG/DL (ref 8.3–10.6)
CHLORIDE BLD-SCNC: 104 MMOL/L (ref 99–110)
CHOLESTEROL, FASTING: 230 MG/DL (ref 0–199)
CO2: 27 MMOL/L (ref 21–32)
CREAT SERPL-MCNC: 0.6 MG/DL (ref 0.6–1.1)
EOSINOPHILS ABSOLUTE: 0.1 K/UL (ref 0–0.6)
EOSINOPHILS RELATIVE PERCENT: 1.6 %
GFR AFRICAN AMERICAN: >60
GFR NON-AFRICAN AMERICAN: >60
GLOBULIN: 2.4 G/DL
GLUCOSE FASTING: 99 MG/DL (ref 70–99)
HCT VFR BLD CALC: 39.6 % (ref 36–48)
HDLC SERPL-MCNC: 67 MG/DL (ref 40–60)
HEMOGLOBIN: 13.4 G/DL (ref 12–16)
LDL CHOLESTEROL CALCULATED: 130 MG/DL
LYMPHOCYTES ABSOLUTE: 2 K/UL (ref 1–5.1)
LYMPHOCYTES RELATIVE PERCENT: 37.4 %
MCH RBC QN AUTO: 31.7 PG (ref 26–34)
MCHC RBC AUTO-ENTMCNC: 33.9 G/DL (ref 31–36)
MCV RBC AUTO: 93.5 FL (ref 80–100)
MONOCYTES ABSOLUTE: 0.6 K/UL (ref 0–1.3)
MONOCYTES RELATIVE PERCENT: 10.6 %
NEUTROPHILS ABSOLUTE: 2.7 K/UL (ref 1.7–7.7)
NEUTROPHILS RELATIVE PERCENT: 49.5 %
PDW BLD-RTO: 12.9 % (ref 12.4–15.4)
PLATELET # BLD: 266 K/UL (ref 135–450)
PMV BLD AUTO: 8.7 FL (ref 5–10.5)
POTASSIUM SERPL-SCNC: 4.2 MMOL/L (ref 3.5–5.1)
RBC # BLD: 4.23 M/UL (ref 4–5.2)
SARS-COV-2 ANTIBODY, TOTAL: POSITIVE
SODIUM BLD-SCNC: 141 MMOL/L (ref 136–145)
TOTAL PROTEIN: 7.2 G/DL (ref 6.4–8.2)
TRIGLYCERIDE, FASTING: 163 MG/DL (ref 0–150)
TSH REFLEX: 1.35 UIU/ML (ref 0.27–4.2)
VLDLC SERPL CALC-MCNC: 33 MG/DL
WBC # BLD: 5.5 K/UL (ref 4–11)

## 2021-07-28 LAB
ESTIMATED AVERAGE GLUCOSE: 114 MG/DL
HBA1C MFR BLD: 5.6 %

## 2021-08-03 NOTE — PROGRESS NOTES
Chief Complaint:   Erlinda Samuels is a 61 y.o. female who presents for complete physical examination    History of Present Illness:    Meds, vitamins and allergies reviewed with pt  Labs reviewed with pt/acceptable except for elevated LDL    Not exercising and could eat better  Commend healthier diet and more exercise and repeat cholesterol 6 to 12 months    Had covid in past   Has not been vaccinated as of yet, recommend she think about it and do Pfizer vaccine through [employer]    Some arthritis in her hands, recommend Voltaren gel    Wt Readings from Last 3 Encounters:   21 148 lb (67.1 kg)   21 145 lb (65.8 kg)   21 143 lb 6.4 oz (65 kg)       Patient Active Problem List   Diagnosis    Enlarged uterus    Heartburn    Fibrocystic breast    Vitamin D deficiency    FHx: colon cancer    FHx: kidney cancer    Paget's carcinoma of the nipple, left ([address]    Herpetic jax of finger of right hand with lymphangitis    COVID-19 virus infection     Past Medical History:   Diagnosis Date    [facility] [address] [address] 2007    paget disease left breast    Incomplete RBBB     Paget disease of breast, left ([address]        Past Surgical History:   Procedure Laterality Date    BREAST BIOPSY      left breast      SECTION      CHOLECYSTECTOMY, LAPAROSCOPIC  3/28/14    COLONOSCOPY      ENDOMETRIAL ABLATION      RI MASTECTOMY, PARTIAL Left 2018    LEFT CENTRAL LUMPECTOMY, BIOZORB MARKER performed by Marline Ham MD at [address]       Current Outpatient Medications   Medication Sig Dispense Refill    famotidine (PEPCID) 20 MG tablet Take 1 tablet by mouth 2 times daily as needed (reflux) 60 tablet 0    diclofenac sodium (VOLTAREN) 1 % GEL Apply 2 g topically 2 times daily 150 g 3    fluticasone (FLONASE) 50 MCG/ACT nasal spray 2 sprays by Nasal route daily 1 Bottle 0    vitamin D (CHOLECALCIFEROL) 1000 UNIT TABS tablet Take 2,000 Units by mouth daily        No current facility-administered medications for this visit. No Known Allergies    Social History     Socioeconomic History    Marital status:      Spouse name: Princess Lee Number of children: 3    Years of education: None    Highest education level: None   Occupational History    None   Tobacco Use    Smoking status: Never Smoker    Smokeless tobacco: Never Used    Tobacco comment: college days   Substance and Sexual Activity    Alcohol use: Yes     Comment: occ    Drug use: No    Sexual activity: Yes     Partners: Male   Other Topics Concern    None   Social History Narrative    None     Social Determinants of Health     Financial Resource Strain: Low Risk     Difficulty of Paying Living Expenses: Not hard at all   Food Insecurity: No Food Insecurity    Worried About Running Out of Food in the Last Year: Never true    [address] in the Last Year: Never true   Transportation Needs:     Lack of Transportation (Medical):      Lack of Transportation (Non-Medical):    Physical Activity:     Days of Exercise per Week:     Minutes of Exercise per Session:    Stress:     Feeling of Stress :    Social Connections:     Frequency of Communication with Friends and Family:     Frequency of Social Gatherings with Friends and Family:     Attends Yazidism Services:     Active Member of Clubs or Organizations:     Attends Club or Organization Meetings:     Marital Status:    Intimate Partner Violence:     Fear of Current or Ex-Partner:     Emotionally Abused:     Physically Abused:     Sexually Abused:      Family History   Problem Relation Age of Onset    Lupus Mother    Dorisann Bias Mother 79        colon cancer    Cancer Father 70        renal cancer    Cancer Paternal Grandfather         Throat    Hypertension Maternal Grandmother     Coronary Art Dis Maternal Grandmother     Breast Cancer Maternal Grandmother     Ovarian Cancer Paternal Grandmother                              REVIEW OF SYSTEMS:   CONSTITUTIONAL: No major weight gain or loss, fatigue, weakness, night sweats or fever. HEENT: No new vision difficulties or ringing in the ears. RESPIRATORY: No new SOB, PND, orthopnea or cough. CARDIOVASCULAR: no CP, palpitations or SOB with exertion  GI: No nausea, vomiting, diarrhea, constipation, abdominal pain or changes in bowel habits. : No urinary frequency, urgency, incontinence hematuria or dysuria. SKIN: No cyanosis or skin lesions. MUSCULOSKELETAL: No new muscle or joint pain. NEUROLOGICAL: No syncope or TIA-like symptoms. PSYCHIATRIC: No anxiety, insomnia or depression     PHYSICAL EXAMINATION:  /80   Pulse 83   Ht 5' 1\" (1.549 m)   Wt 148 lb (67.1 kg)   LMP 08/03/2013 (Exact Date)   SpO2 100%   BMI 27.96 kg/m²   General appearance: healthy, alert, no distress  Skin: Skin color, texture, turgor normal. No rashes or lesions. Head: Normocephalic. No masses, lesions, tenderness or abnormalities  Eyes: conjunctivae/corneas clear. PERRL, EOM's intact. Ears: External ears normal. Canals clear. TM's clear bilaterally. Nose/Sinuses: Not examined due to Covid/mask  Oropharynx: Not examined due to Covid/mask on  Neck: Neck supple, and symmetric. No adenopathy. Thyroid symmetric, normal size, without nodule. Trachea is midline. No bruits. Back: Back symmetric, no curvature. ROM normal. No CVA tenderness. Lungs: Lungs clear to auscultation bilaterally. No retractions or use of accessory muscles. Heart: Regular rate and rhythm, with no rub, murmur or gallop noted. Breasts: no masses, well-healed skin changes left breast from previous treatment  Abdomen: Abdomen soft, non-tender. BS normal. No masses, organomegaly. No hernia noted. Extremities: Extremities normal. No deformities, edema, or skin discoloration. No cyanosis or clubbing noted to the nails.   Arthritis, base of left hand  Lymph: No lymphadenopathy of the neck or supraclavicular regions. Musculoskeletal: Spine ROM normal. Muscular strength intact. Peripheral pulses: radial=4/4, femoral=4/4, dorsalis pedis=4/4,  Neuro: Cranial nerves intact, Gait normal. Reflexes normal and symmetric, with no pathologic reflex noted. No focal weakness. Normal sensation to light touch. Pelvic/Rectal: Exam deferred to OB/GYN    Urine dip reviewed/negative    ASSESSMENT/PLAN:  1. Physical exam, routine  Healthy diet, more exercise for weight loss  - POCT Urinalysis no Micro    2. Hand arthritis  Trial of Voltaren gel    3. Paget's carcinoma of the nipple, left ([facility] [address]  Able, sees breast surgeon regularly    4. Elevated LDL cholesterol level  Healthy diet more exercise recheck in the future/6-month  - Lipid Panel; Future    5. Vitamin D deficiency  Supplement daily    6. FHx: colon cancer  Stable, regular surveillance with colonoscopy    7.  COVID-19 virus infection  Still has positive antibody, recommend Pfizer vaccine, she will speak with the pharmacist about this

## 2021-08-04 ENCOUNTER — OFFICE VISIT (OUTPATIENT)
Dept: FAMILY MEDICINE CLINIC | Age: 56
End: 2021-08-04
Payer: COMMERCIAL

## 2021-08-04 VITALS
BODY MASS INDEX: 27.94 KG/M2 | HEIGHT: 61 IN | WEIGHT: 148 LBS | SYSTOLIC BLOOD PRESSURE: 132 MMHG | DIASTOLIC BLOOD PRESSURE: 80 MMHG | HEART RATE: 83 BPM | OXYGEN SATURATION: 100 %

## 2021-08-04 DIAGNOSIS — Z80.0 FHX: COLON CANCER: ICD-10-CM

## 2021-08-04 DIAGNOSIS — U07.1 COVID-19 VIRUS INFECTION: ICD-10-CM

## 2021-08-04 DIAGNOSIS — E78.00 ELEVATED LDL CHOLESTEROL LEVEL: ICD-10-CM

## 2021-08-04 DIAGNOSIS — M19.049 HAND ARTHRITIS: ICD-10-CM

## 2021-08-04 DIAGNOSIS — Z00.00 PHYSICAL EXAM, ROUTINE: Primary | ICD-10-CM

## 2021-08-04 DIAGNOSIS — C50.012 PAGET'S CARCINOMA OF THE NIPPLE, LEFT (HCC): ICD-10-CM

## 2021-08-04 DIAGNOSIS — E55.9 VITAMIN D DEFICIENCY: ICD-10-CM

## 2021-08-04 LAB
BILIRUBIN, POC: NORMAL
BLOOD URINE, POC: NORMAL
CLARITY, POC: CLEAR
COLOR, POC: YELLOW
GLUCOSE URINE, POC: NORMAL
KETONES, POC: NORMAL
LEUKOCYTE EST, POC: NORMAL
NITRITE, POC: NORMAL
PH, POC: 6.5
PROTEIN, POC: NORMAL
SPECIFIC GRAVITY, POC: 1.01
UROBILINOGEN, POC: 0.2

## 2021-08-04 PROCEDURE — 99396 PREV VISIT EST AGE 40-64: CPT | Performed by: FAMILY MEDICINE

## 2021-08-04 PROCEDURE — 81002 URINALYSIS NONAUTO W/O SCOPE: CPT | Performed by: FAMILY MEDICINE

## 2021-08-04 RX ORDER — FAMOTIDINE 20 MG/1
20 TABLET, FILM COATED ORAL 2 TIMES DAILY PRN
Qty: 60 TABLET | Refills: 0 | Status: SHIPPED | OUTPATIENT
Start: 2021-08-04 | End: 2022-08-08

## 2021-08-04 SDOH — ECONOMIC STABILITY: FOOD INSECURITY: WITHIN THE PAST 12 MONTHS, YOU WORRIED THAT YOUR FOOD WOULD RUN OUT BEFORE YOU GOT MONEY TO BUY MORE.: NEVER TRUE

## 2021-08-04 SDOH — ECONOMIC STABILITY: FOOD INSECURITY: WITHIN THE PAST 12 MONTHS, THE FOOD YOU BOUGHT JUST DIDN'T LAST AND YOU DIDN'T HAVE MONEY TO GET MORE.: NEVER TRUE

## 2021-08-04 ASSESSMENT — SOCIAL DETERMINANTS OF HEALTH (SDOH): HOW HARD IS IT FOR YOU TO PAY FOR THE VERY BASICS LIKE FOOD, HOUSING, MEDICAL CARE, AND HEATING?: NOT HARD AT ALL

## 2022-01-07 ENCOUNTER — NURSE ONLY (OUTPATIENT)
Dept: FAMILY MEDICINE CLINIC | Age: 57
End: 2022-01-07

## 2022-01-07 DIAGNOSIS — E78.00 ELEVATED LDL CHOLESTEROL LEVEL: ICD-10-CM

## 2022-01-07 DIAGNOSIS — Z11.52 ENCOUNTER FOR SCREENING FOR COVID-19: Primary | ICD-10-CM

## 2022-01-08 LAB — SARS-COV-2: NOT DETECTED

## 2022-01-08 NOTE — TELEPHONE ENCOUNTER
Ov with Dr. Corie Casillas today
Patient thinks she may have shingle. She has a red patch on her right index finger that she noticed 4-5 days ago. She noticed swelling in her right arm yesterday. It feels tight and warm to touch. No other symptoms. Should we do an OV or VV?     Please advise
DISPLAY PLAN FREE TEXT

## 2022-03-17 LAB
CHOLESTEROL, TOTAL: 210 MG/DL (ref 0–199)
HDLC SERPL-MCNC: 65 MG/DL (ref 40–60)
LDL CHOLESTEROL CALCULATED: 119 MG/DL
TRIGL SERPL-MCNC: 128 MG/DL (ref 0–150)
VLDLC SERPL CALC-MCNC: 26 MG/DL

## 2022-07-18 ENCOUNTER — TELEPHONE (OUTPATIENT)
Dept: FAMILY MEDICINE CLINIC | Age: 57
End: 2022-07-18

## 2022-07-18 DIAGNOSIS — C50.012 PAGET'S CARCINOMA OF THE NIPPLE, LEFT (HCC): ICD-10-CM

## 2022-07-18 DIAGNOSIS — Z00.00 PHYSICAL EXAM, ROUTINE: ICD-10-CM

## 2022-07-18 DIAGNOSIS — R12 HEARTBURN: Primary | ICD-10-CM

## 2022-07-18 NOTE — TELEPHONE ENCOUNTER
----- Message from [facility] sent at 7/18/2022  1:39 PM EDT -----  Subject: Message to Provider    QUESTIONS  Information for Provider? pt would like to schedule for labs for physical   ---------------------------------------------------------------------------  --------------  [id number] [employer]  [phone]; OK to leave message on voicemail  ---------------------------------------------------------------------------  --------------  SCRIPT ANSWERS  Relationship to Patient?  Self

## 2022-07-26 ENCOUNTER — TELEPHONE (OUTPATIENT)
Dept: FAMILY MEDICINE CLINIC | Age: 57
End: 2022-07-26

## 2022-07-26 DIAGNOSIS — R73.01 IMPAIRED FASTING GLUCOSE: Primary | ICD-10-CM

## 2022-07-26 NOTE — TELEPHONE ENCOUNTER
----- Message from Kirti Sherlynjerod sent at 7/25/2022  3:25 PM EDT -----  Subject: Referral Request    Reason for referral request? Blood work  Provider patient wants to be referred to(if known):     Provider Phone Number(if known):     Additional Information for Provider?   ---------------------------------------------------------------------------  --------------  [id number] [employer]    [phone]; OK to leave message on voicemail  ---------------------------------------------------------------------------  --------------

## 2022-08-02 DIAGNOSIS — R73.01 IMPAIRED FASTING GLUCOSE: ICD-10-CM

## 2022-08-02 DIAGNOSIS — R12 HEARTBURN: ICD-10-CM

## 2022-08-02 DIAGNOSIS — C50.012 PAGET'S CARCINOMA OF THE NIPPLE, LEFT (HCC): ICD-10-CM

## 2022-08-02 DIAGNOSIS — Z00.00 PHYSICAL EXAM, ROUTINE: ICD-10-CM

## 2022-08-02 LAB
A/G RATIO: 1.8 (ref 1.1–2.2)
ALBUMIN SERPL-MCNC: 4.4 G/DL (ref 3.4–5)
ALP BLD-CCNC: 91 U/L (ref 40–129)
ALT SERPL-CCNC: 27 U/L (ref 10–40)
ANION GAP SERPL CALCULATED.3IONS-SCNC: 12 MMOL/L (ref 3–16)
AST SERPL-CCNC: 25 U/L (ref 15–37)
BASOPHILS ABSOLUTE: 0 K/UL (ref 0–0.2)
BASOPHILS RELATIVE PERCENT: 0.4 %
BILIRUB SERPL-MCNC: 0.4 MG/DL (ref 0–1)
BUN BLDV-MCNC: 12 MG/DL (ref 7–20)
CALCIUM SERPL-MCNC: 9.2 MG/DL (ref 8.3–10.6)
CHLORIDE BLD-SCNC: 106 MMOL/L (ref 99–110)
CHOLESTEROL, TOTAL: 205 MG/DL (ref 0–199)
CO2: 26 MMOL/L (ref 21–32)
CREAT SERPL-MCNC: 0.7 MG/DL (ref 0.6–1.1)
EOSINOPHILS ABSOLUTE: 0.2 K/UL (ref 0–0.6)
EOSINOPHILS RELATIVE PERCENT: 4.8 %
GFR AFRICAN AMERICAN: >60
GFR NON-AFRICAN AMERICAN: >60
GLUCOSE BLD-MCNC: 92 MG/DL (ref 70–99)
HCT VFR BLD CALC: 39.6 % (ref 36–48)
HDLC SERPL-MCNC: 58 MG/DL (ref 40–60)
HEMOGLOBIN: 13.3 G/DL (ref 12–16)
LDL CHOLESTEROL CALCULATED: 122 MG/DL
LYMPHOCYTES ABSOLUTE: 1.9 K/UL (ref 1–5.1)
LYMPHOCYTES RELATIVE PERCENT: 42.4 %
MCH RBC QN AUTO: 31.4 PG (ref 26–34)
MCHC RBC AUTO-ENTMCNC: 33.6 G/DL (ref 31–36)
MCV RBC AUTO: 93.3 FL (ref 80–100)
MONOCYTES ABSOLUTE: 0.6 K/UL (ref 0–1.3)
MONOCYTES RELATIVE PERCENT: 14.4 %
NEUTROPHILS ABSOLUTE: 1.7 K/UL (ref 1.7–7.7)
NEUTROPHILS RELATIVE PERCENT: 38 %
PDW BLD-RTO: 13.1 % (ref 12.4–15.4)
PLATELET # BLD: 264 K/UL (ref 135–450)
PMV BLD AUTO: 7.5 FL (ref 5–10.5)
POTASSIUM SERPL-SCNC: 4.2 MMOL/L (ref 3.5–5.1)
RBC # BLD: 4.25 M/UL (ref 4–5.2)
SODIUM BLD-SCNC: 144 MMOL/L (ref 136–145)
TOTAL PROTEIN: 6.9 G/DL (ref 6.4–8.2)
TRIGL SERPL-MCNC: 125 MG/DL (ref 0–150)
TSH REFLEX: 1.97 UIU/ML (ref 0.27–4.2)
VLDLC SERPL CALC-MCNC: 25 MG/DL
WBC # BLD: 4.5 K/UL (ref 4–11)

## 2022-08-03 LAB
ESTIMATED AVERAGE GLUCOSE: 108.3 MG/DL
HBA1C MFR BLD: 5.4 %

## 2022-08-04 ENCOUNTER — HOSPITAL ENCOUNTER (OUTPATIENT)
Dept: MAMMOGRAPHY | Age: 57
Discharge: HOME | End: 2022-08-04
Payer: COMMERCIAL

## 2022-08-04 VITALS — HEIGHT: 61 IN | WEIGHT: 148 LBS | BODY MASS INDEX: 27.94 KG/M2

## 2022-08-04 DIAGNOSIS — Z85.3 PERSONAL HISTORY OF MALIGNANT NEOPLASM OF BREAST: ICD-10-CM

## 2022-08-04 PROCEDURE — G0279 TOMOSYNTHESIS, MAMMO: HCPCS

## 2022-08-08 ENCOUNTER — OFFICE VISIT (OUTPATIENT)
Dept: FAMILY MEDICINE CLINIC | Age: 57
End: 2022-08-08
Payer: COMMERCIAL

## 2022-08-08 VITALS
HEIGHT: 61 IN | HEART RATE: 82 BPM | SYSTOLIC BLOOD PRESSURE: 118 MMHG | RESPIRATION RATE: 16 BRPM | OXYGEN SATURATION: 95 % | WEIGHT: 157 LBS | BODY MASS INDEX: 29.64 KG/M2 | DIASTOLIC BLOOD PRESSURE: 84 MMHG

## 2022-08-08 DIAGNOSIS — Z80.51 FHX: KIDNEY CANCER: ICD-10-CM

## 2022-08-08 DIAGNOSIS — Z23 NEED FOR TDAP VACCINATION: ICD-10-CM

## 2022-08-08 DIAGNOSIS — Z80.0 FHX: COLON CANCER: ICD-10-CM

## 2022-08-08 DIAGNOSIS — Z23 NEED FOR COVID-19 VACCINE: ICD-10-CM

## 2022-08-08 DIAGNOSIS — Z00.00 ANNUAL PHYSICAL EXAM: Primary | ICD-10-CM

## 2022-08-08 DIAGNOSIS — C50.012 PAGET'S CARCINOMA OF THE NIPPLE, LEFT (HCC): ICD-10-CM

## 2022-08-08 LAB
BILIRUBIN, POC: NORMAL
BLOOD URINE, POC: NORMAL
CLARITY, POC: CLEAR
COLOR, POC: YELLOW
GLUCOSE URINE, POC: NORMAL
KETONES, POC: NORMAL
LEUKOCYTE EST, POC: NORMAL
NITRITE, POC: NORMAL
PH, POC: 7
PROTEIN, POC: NORMAL
SPECIFIC GRAVITY, POC: 1.01
UROBILINOGEN, POC: 0.2

## 2022-08-08 PROCEDURE — 90715 TDAP VACCINE 7 YRS/> IM: CPT | Performed by: FAMILY MEDICINE

## 2022-08-08 PROCEDURE — 90471 IMMUNIZATION ADMIN: CPT | Performed by: FAMILY MEDICINE

## 2022-08-08 PROCEDURE — 81002 URINALYSIS NONAUTO W/O SCOPE: CPT | Performed by: FAMILY MEDICINE

## 2022-08-08 PROCEDURE — 99396 PREV VISIT EST AGE 40-64: CPT | Performed by: FAMILY MEDICINE

## 2022-08-08 RX ORDER — FAMOTIDINE 20 MG/1
20 TABLET, FILM COATED ORAL 2 TIMES DAILY
Qty: 60 TABLET | Refills: 3 | Status: SHIPPED | OUTPATIENT
Start: 2022-08-08

## 2022-08-08 SDOH — ECONOMIC STABILITY: FOOD INSECURITY: WITHIN THE PAST 12 MONTHS, THE FOOD YOU BOUGHT JUST DIDN'T LAST AND YOU DIDN'T HAVE MONEY TO GET MORE.: NEVER TRUE

## 2022-08-08 SDOH — ECONOMIC STABILITY: FOOD INSECURITY: WITHIN THE PAST 12 MONTHS, YOU WORRIED THAT YOUR FOOD WOULD RUN OUT BEFORE YOU GOT MONEY TO BUY MORE.: NEVER TRUE

## 2022-08-08 ASSESSMENT — PATIENT HEALTH QUESTIONNAIRE - PHQ9
SUM OF ALL RESPONSES TO PHQ QUESTIONS 1-9: 0
1. LITTLE INTEREST OR PLEASURE IN DOING THINGS: 0
SUM OF ALL RESPONSES TO PHQ QUESTIONS 1-9: 0
SUM OF ALL RESPONSES TO PHQ9 QUESTIONS 1 & 2: 0
2. FEELING DOWN, DEPRESSED OR HOPELESS: 0

## 2022-08-08 ASSESSMENT — SOCIAL DETERMINANTS OF HEALTH (SDOH): HOW HARD IS IT FOR YOU TO PAY FOR THE VERY BASICS LIKE FOOD, HOUSING, MEDICAL CARE, AND HEATING?: NOT HARD AT ALL

## 2022-08-08 NOTE — PROGRESS NOTES
Chief Complaint:   Shira Madera is a 61 y.o. female who presents for complete physical examination    History of Present Illness:    Meds, vitamins and allergies reviewed with pt  Labs reviewed with pt/all acceptable    Starting to get a little bit anxious before school starting  Recommend COVID-vaccine talk to pharmacy about treatment options    Occasional indigestion, recommend Pepcid AC in a.m. 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    COVID-19 virus infection     Past Medical History:   Diagnosis Date    Breast cancer ([facility] [address] 2018    paget disease left breast    Incomplete RBBB     Paget disease of breast, left [facility])        Past Surgical History:   Procedure Laterality Date    BREAST BIOPSY  2018    left breast      SECTION      CHOLECYSTECTOMY, LAPAROSCOPIC  2014    COLONOSCOPY  2008    ENDOMETRIAL ABLATION      ID MASTECTOMY, PARTIAL Left 2018    LEFT CENTRAL LUMPECTOMY, BIOZORB MARKER performed by Prosper Turner MD at [address]       Current Outpatient Medications   Medication Sig Dispense Refill    famotidine (PEPCID) 20 MG tablet Take 1 tablet by mouth in the morning and 1 tablet before bedtime. 60 tablet 3    fluticasone (FLONASE) 50 MCG/ACT nasal spray 2 sprays by Nasal route daily 1 Bottle 0    vitamin D (CHOLECALCIFEROL) 1000 UNIT TABS tablet Take 2,000 Units by mouth daily        No current facility-administered medications for this visit.      No Known Allergies    Social History     Socioeconomic History    Marital status:      Spouse name: Charles Butt    Number of children: 3    Years of education: None    Highest education level: None   Tobacco Use    Smoking status: Never    Smokeless tobacco: Never    Tobacco comments:     college days   Substance and Sexual Activity    Alcohol use: Yes     Comment: occ    Drug use: No    Sexual activity: Yes     Partners: Male     Social Determinants of Health     Financial Resource Strain: Low Risk     Difficulty of Paying Living Expenses: Not hard at all   Food Insecurity: No Food Insecurity    Worried About Running Out of Food in the Last Year: Never true    Ran Out of Food in the Last Year: Never true     Family History   Problem Relation Age of Onset    Lupus Mother     Cancer Mother 79        colon cancer    Cancer Father 70        renal cancer    Cancer Paternal Grandfather         Throat    Hypertension Maternal Grandmother     Coronary Art Dis Maternal Grandmother     Breast Cancer Maternal Grandmother     Ovarian Cancer Paternal Grandmother                              REVIEW OF SYSTEMS:   CONSTITUTIONAL: No major weight gain or loss, fatigue, weakness, night sweats or fever. HEENT: No new vision difficulties or ringing in the ears. RESPIRATORY: No new SOB, PND, orthopnea or cough. CARDIOVASCULAR: no CP, palpitations or SOB with exertion  GI: No nausea, vomiting, diarrhea, constipation, abdominal pain or changes in bowel habits. : No urinary frequency, urgency, incontinence hematuria or dysuria. SKIN: No cyanosis or skin lesions. MUSCULOSKELETAL: No new muscle or joint pain. NEUROLOGICAL: No syncope or TIA-like symptoms.    PSYCHIATRIC: No anxiety, insomnia or depression     PHYSICAL EXAMINATION:  /84 (Site: Left Upper Arm, Position: Sitting, Cuff Size: Large Adult)   Pulse 82   Resp 16   Ht 5' 1\" (1.549 m)   Wt 157 lb (71.2 kg)   LMP 08/03/2013 (Exact Date)   SpO2 95%   BMI 29.66 kg/m²   General appearance: healthy, alert, no distress  Skin: Skin color, texture, turgor normal. No rashes or lesions. Head: Normocephalic. No masses, lesions, tenderness or abnormalities  Eyes: conjunctivae/corneas clear. PERRL, EOM's intact. Ears: External ears normal. Canals clear. TM's clear bilaterally. Nose/Sinuses: No drainage or sinus tenderness. Oropharynx: No complaints, not examined  Neck: Neck supple, and symmetric. No adenopathy. Thyroid symmetric, normal size, without nodule. Trachea is midline. No bruits. Back: Back symmetric, no curvature. ROM normal. No CVA tenderness. Lungs: Lungs clear to auscultation bilaterally. No retractions or use of accessory muscles. Heart: Regular rate and rhythm, with no rub, murmur or gallop noted. Breasts: no masses, skin changes or DC, sees breast surgeon on a regular basis  Abdomen: Abdomen soft, non-tender. BS normal. No masses, organomegaly. No hernia noted. Extremities: Extremities normal. No deformities, edema, or skin discoloration. No cyanosis or clubbing noted to the nails. Lymph: No lymphadenopathy of the neck or supraclavicular regions. Musculoskeletal: Spine ROM normal. Muscular strength intact. Peripheral pulses: radial=4/4, femoral=4/4, dorsalis pedis=4/4,  Neuro: Cranial nerves intact, Gait normal. Reflexes normal and symmetric, with no pathologic reflex noted. No focal weakness. Normal sensation to light touch. Pelvic/Rectal: Exam deferred to OB/GYN    Urine dip : reviewed , no sxs     STEVEN 7= 4   patient is reassured    ASSESSMENT/PLAN:  1. Annual physical exam  Stable  Consider COVID vaccination  - POCT Urinalysis no Micro    Update tetanus today  Recommend COVID-vaccine moving forward , to speak with pharmacist about options     2. Paget's carcinoma of the nipple, left (HCC)  Stable, seeing breast surgeon regularly    3. FHx: kidney cancer  UA clear of blood    4.  FHx: colon cancer  Higher  risk screening 5 years

## 2022-12-27 ENCOUNTER — OFFICE VISIT (OUTPATIENT)
Dept: FAMILY MEDICINE CLINIC | Age: 57
End: 2022-12-27
Payer: COMMERCIAL

## 2022-12-27 ENCOUNTER — TELEPHONE (OUTPATIENT)
Dept: FAMILY MEDICINE CLINIC | Age: 57
End: 2022-12-27

## 2022-12-27 VITALS
DIASTOLIC BLOOD PRESSURE: 80 MMHG | WEIGHT: 155 LBS | HEIGHT: 61 IN | SYSTOLIC BLOOD PRESSURE: 112 MMHG | OXYGEN SATURATION: 97 % | BODY MASS INDEX: 29.27 KG/M2 | HEART RATE: 92 BPM

## 2022-12-27 DIAGNOSIS — Z23 FLU VACCINE NEED: Primary | ICD-10-CM

## 2022-12-27 PROCEDURE — 99213 OFFICE O/P EST LOW 20 MIN: CPT | Performed by: FAMILY MEDICINE

## 2022-12-27 PROCEDURE — 90471 IMMUNIZATION ADMIN: CPT | Performed by: FAMILY MEDICINE

## 2022-12-27 PROCEDURE — 90674 CCIIV4 VAC NO PRSV 0.5 ML IM: CPT | Performed by: FAMILY MEDICINE

## 2022-12-27 RX ORDER — CIPROFLOXACIN/HYDROCORTISONE 0.2 %-1 %
3 SUSPENSION, DROPS(FINAL DOSAGE FORM)(ML) OTIC (EAR) 2 TIMES DAILY
Qty: 1 EACH | Refills: 0 | Status: SHIPPED | OUTPATIENT
Start: 2022-12-27 | End: 2023-01-03

## 2022-12-27 ASSESSMENT — PATIENT HEALTH QUESTIONNAIRE - PHQ9
SUM OF ALL RESPONSES TO PHQ QUESTIONS 1-9: 0
2. FEELING DOWN, DEPRESSED OR HOPELESS: 0
SUM OF ALL RESPONSES TO PHQ9 QUESTIONS 1 & 2: 0
SUM OF ALL RESPONSES TO PHQ QUESTIONS 1-9: 0
1. LITTLE INTEREST OR PLEASURE IN DOING THINGS: 0

## 2022-12-27 NOTE — PROGRESS NOTES
Chito Mckay is a 61 y.o. female. HPI:  [facility]us Shelley [facility] for  sinusitis , treated with augmentin and pred, got better  Then about a week ago  got intestinal flu for 3 days, and got better   then left ear started to drain for 5 days with some blood  No ear pain loss of hearing known trauma although she does use Q-tips occasionally    Wt Readings from Last 3 Encounters:   12/27/22 155 lb (70.3 kg)   08/08/22 157 lb (71.2 kg)   08/04/22 148 lb (67.1 kg)     Meds, vitamins and allergies reviewed with Patient    ROS:  Gen:  no fever  HEENT:   no cold symptoms, no sore throat. CV:  Denies chest pain or palpitations. Pulm:  Denies shortness of breath, cough. Abd:  Denies abdominal pain, nausea and vomiting. Skin: no rash    No Known Allergies    Prior to Visit Medications    Medication Sig Taking? Authorizing Provider   famotidine (PEPCID) 20 MG tablet Take 1 tablet by mouth in the morning and 1 tablet before bedtime. Yes Jamaica Fernando MD   fluticasone (FLONASE) 50 MCG/ACT nasal spray 2 sprays by Nasal route daily Yes Jamaica Fernando MD   vitamin D (CHOLECALCIFEROL) 1000 UNIT TABS tablet Take 2,000 Units by mouth daily  Yes Historical Provider, MD       OBJECTIVE:  /80   Pulse 92   Ht 5' 1\" (1.549 m)   Wt 155 lb (70.3 kg)   LMP 08/03/2013 (Exact Date)   SpO2 97%   BMI 29.29 kg/m²   GEN:  in NAD  HEENT:  NCAT, TMs:normal and throat: clear left microtrauma with blood shows some bright red blood without any discharge or wax or swelling  NECK:  Supple without adenopathy. CV:  Regular rate and rhythm, S1 and S2 normal, no murmurs, clicks  PULM:  Chest is clear, no wheezing ,  symmetric air entry throughout both lung fields.   NEURO: Alert and oriented ×3    ASSESSMENT/PLAN:  Left ear canal microtrauma with some blood  Cannot rule out early otitis externa    Ciprodex 3 drops twice daily left ear  No Q-tips    Flu shot per request  Declines COVID-vaccine

## 2022-12-27 NOTE — TELEPHONE ENCOUNTER
Patient is calling stating that her insurance will not cover ciprofloxacin-hydrocortisone (CIPRO HC) 0.2-1 % otic suspension  . She will be calling them back to see what medications that they will cover. She stated that she was willing to pay out of pocket but the medication was over  $200 dollars.  Please advise

## 2022-12-30 ENCOUNTER — TELEPHONE (OUTPATIENT)
Dept: FAMILY MEDICINE CLINIC | Age: 57
End: 2022-12-30

## 2023-05-12 ENCOUNTER — OFFICE VISIT (OUTPATIENT)
Dept: FAMILY MEDICINE CLINIC | Age: 58
End: 2023-05-12
Payer: COMMERCIAL

## 2023-05-12 VITALS
BODY MASS INDEX: 29 KG/M2 | HEART RATE: 90 BPM | OXYGEN SATURATION: 97 % | RESPIRATION RATE: 16 BRPM | SYSTOLIC BLOOD PRESSURE: 126 MMHG | DIASTOLIC BLOOD PRESSURE: 81 MMHG | WEIGHT: 153.6 LBS | HEIGHT: 61 IN

## 2023-05-12 DIAGNOSIS — R09.81 NASAL SINUS CONGESTION: ICD-10-CM

## 2023-05-12 DIAGNOSIS — J06.9 PROTRACTED URI: Primary | ICD-10-CM

## 2023-05-12 DIAGNOSIS — C50.012 PAGET'S CARCINOMA OF THE NIPPLE, LEFT (HCC): ICD-10-CM

## 2023-05-12 PROCEDURE — 99213 OFFICE O/P EST LOW 20 MIN: CPT | Performed by: FAMILY MEDICINE

## 2023-05-12 RX ORDER — FLUTICASONE PROPIONATE 50 MCG
1 SPRAY, SUSPENSION (ML) NASAL DAILY
Qty: 16 G | Refills: 0 | Status: SHIPPED | OUTPATIENT
Start: 2023-05-12

## 2023-05-12 RX ORDER — AMOXICILLIN AND CLAVULANATE POTASSIUM 875; 125 MG/1; MG/1
1 TABLET, FILM COATED ORAL 2 TIMES DAILY
Qty: 14 TABLET | Refills: 0 | Status: SHIPPED | OUTPATIENT
Start: 2023-05-12 | End: 2023-05-19

## 2023-05-12 RX ORDER — FEXOFENADINE HCL 180 MG/1
180 TABLET ORAL DAILY
Qty: 30 TABLET | Refills: 5 | Status: SHIPPED | OUTPATIENT
Start: 2023-05-12

## 2023-05-12 SDOH — ECONOMIC STABILITY: HOUSING INSECURITY
IN THE LAST 12 MONTHS, WAS THERE A TIME WHEN YOU DID NOT HAVE A STEADY PLACE TO SLEEP OR SLEPT IN A SHELTER (INCLUDING NOW)?: NO

## 2023-05-12 SDOH — ECONOMIC STABILITY: FOOD INSECURITY: WITHIN THE PAST 12 MONTHS, THE FOOD YOU BOUGHT JUST DIDN'T LAST AND YOU DIDN'T HAVE MONEY TO GET MORE.: NEVER TRUE

## 2023-05-12 SDOH — ECONOMIC STABILITY: FOOD INSECURITY: WITHIN THE PAST 12 MONTHS, YOU WORRIED THAT YOUR FOOD WOULD RUN OUT BEFORE YOU GOT MONEY TO BUY MORE.: NEVER TRUE

## 2023-05-12 SDOH — ECONOMIC STABILITY: INCOME INSECURITY: HOW HARD IS IT FOR YOU TO PAY FOR THE VERY BASICS LIKE FOOD, HOUSING, MEDICAL CARE, AND HEATING?: NOT HARD AT ALL

## 2023-05-12 ASSESSMENT — PATIENT HEALTH QUESTIONNAIRE - PHQ9
SUM OF ALL RESPONSES TO PHQ QUESTIONS 1-9: 0
SUM OF ALL RESPONSES TO PHQ QUESTIONS 1-9: 0
1. LITTLE INTEREST OR PLEASURE IN DOING THINGS: 0
SUM OF ALL RESPONSES TO PHQ9 QUESTIONS 1 & 2: 0
2. FEELING DOWN, DEPRESSED OR HOPELESS: 0
SUM OF ALL RESPONSES TO PHQ QUESTIONS 1-9: 0
SUM OF ALL RESPONSES TO PHQ QUESTIONS 1-9: 0

## 2023-05-12 NOTE — PROGRESS NOTES
g; Refill: 0  - fexofenadine (ALLEGRA) 180 MG tablet; Take 1 tablet by mouth daily  Dispense: 30 tablet; Refill: 5    3.  Paget's carcinoma of the nipple, left ([facility] [address]  Stable, follows with oncology, almost 5 years out      20 Total Minutes spent pre charting (reviewing problem list, meds, any test results, consultant and hospital notes ) and  obtaining present visit history, performing appropriate medical exam/evaluation, counseling and educating the patient (and family), ordering medications ,tests, and procedures as needed, refilling medication(s), placing referral(s) when needed in addition to coordinating care for this patient and documenting in electronic health record

## 2023-07-05 ENCOUNTER — TELEPHONE (OUTPATIENT)
Dept: FAMILY MEDICINE CLINIC | Age: 58
End: 2023-07-05

## 2023-07-05 DIAGNOSIS — Z00.00 ANNUAL PHYSICAL EXAM: Primary | ICD-10-CM

## 2023-07-05 NOTE — TELEPHONE ENCOUNTER
----- Message from Leah Chavez sent at 7/5/2023 11:01 AM EDT -----  Subject: Message to Provider    QUESTIONS  Information for Provider? Patient is currently scheduled for her annual   physical on 8/21 and would like for us to put her orders in place for labs   asap. She normally gets these done before her appointment so that she can   get her results the day of the appointment. Please contact the patient   once the orders are in place. ---------------------------------------------------------------------------  --------------  Millie RENEE  [phone]; OK to leave message on voicemail  ---------------------------------------------------------------------------  --------------  SCRIPT ANSWERS  Relationship to Patient?  Self

## 2023-09-12 DIAGNOSIS — Z00.00 ANNUAL PHYSICAL EXAM: ICD-10-CM

## 2023-09-12 LAB
ALBUMIN SERPL-MCNC: 4.8 G/DL (ref 3.4–5)
ALBUMIN/GLOB SERPL: 2.1 {RATIO} (ref 1.1–2.2)
ALP SERPL-CCNC: 87 U/L (ref 40–129)
ALT SERPL-CCNC: 17 U/L (ref 10–40)
ANION GAP SERPL CALCULATED.3IONS-SCNC: 13 MMOL/L (ref 3–16)
AST SERPL-CCNC: 21 U/L (ref 15–37)
BASOPHILS # BLD: 0.1 K/UL (ref 0–0.2)
BASOPHILS NFR BLD: 1.1 %
BILIRUB SERPL-MCNC: 0.4 MG/DL (ref 0–1)
BUN SERPL-MCNC: 14 MG/DL (ref 7–20)
CALCIUM SERPL-MCNC: 9.4 MG/DL (ref 8.3–10.6)
CHLORIDE SERPL-SCNC: 105 MMOL/L (ref 99–110)
CHOLEST SERPL-MCNC: 216 MG/DL (ref 0–199)
CO2 SERPL-SCNC: 22 MMOL/L (ref 21–32)
CREAT SERPL-MCNC: 0.7 MG/DL (ref 0.6–1.1)
DEPRECATED RDW RBC AUTO: 12.6 % (ref 12.4–15.4)
EOSINOPHIL # BLD: 0.2 K/UL (ref 0–0.6)
EOSINOPHIL NFR BLD: 2.2 %
GFR SERPLBLD CREATININE-BSD FMLA CKD-EPI: >60 ML/MIN/{1.73_M2}
GLUCOSE SERPL-MCNC: 101 MG/DL (ref 70–99)
HCT VFR BLD AUTO: 40.4 % (ref 36–48)
HDLC SERPL-MCNC: 58 MG/DL (ref 40–60)
HGB BLD-MCNC: 13.5 G/DL (ref 12–16)
LDL CHOLESTEROL CALCULATED: 135 MG/DL
LYMPHOCYTES # BLD: 2.7 K/UL (ref 1–5.1)
LYMPHOCYTES NFR BLD: 39.2 %
MCH RBC QN AUTO: 31 PG (ref 26–34)
MCHC RBC AUTO-ENTMCNC: 33.4 G/DL (ref 31–36)
MCV RBC AUTO: 93 FL (ref 80–100)
MONOCYTES # BLD: 0.7 K/UL (ref 0–1.3)
MONOCYTES NFR BLD: 10.5 %
NEUTROPHILS # BLD: 3.2 K/UL (ref 1.7–7.7)
NEUTROPHILS NFR BLD: 47 %
PLATELET # BLD AUTO: 309 K/UL (ref 135–450)
PMV BLD AUTO: 8.3 FL (ref 5–10.5)
POTASSIUM SERPL-SCNC: 4.1 MMOL/L (ref 3.5–5.1)
PROT SERPL-MCNC: 7.1 G/DL (ref 6.4–8.2)
RBC # BLD AUTO: 4.35 M/UL (ref 4–5.2)
SODIUM SERPL-SCNC: 140 MMOL/L (ref 136–145)
TRIGL SERPL-MCNC: 113 MG/DL (ref 0–150)
TSH SERPL DL<=0.005 MIU/L-ACNC: 1.81 UIU/ML (ref 0.27–4.2)
VLDLC SERPL CALC-MCNC: 23 MG/DL
WBC # BLD AUTO: 6.8 K/UL (ref 4–11)

## 2023-09-13 ENCOUNTER — OFFICE VISIT (OUTPATIENT)
Dept: FAMILY MEDICINE CLINIC | Age: 58
End: 2023-09-13
Payer: COMMERCIAL

## 2023-09-13 VITALS
BODY MASS INDEX: 28.32 KG/M2 | SYSTOLIC BLOOD PRESSURE: 126 MMHG | HEIGHT: 61 IN | DIASTOLIC BLOOD PRESSURE: 84 MMHG | OXYGEN SATURATION: 97 % | HEART RATE: 104 BPM | WEIGHT: 150 LBS

## 2023-09-13 DIAGNOSIS — G89.29 CHRONIC HEEL PAIN, RIGHT: ICD-10-CM

## 2023-09-13 DIAGNOSIS — Z71.2 ENCOUNTER TO DISCUSS TEST RESULTS: ICD-10-CM

## 2023-09-13 DIAGNOSIS — C50.012 PAGET'S CARCINOMA OF THE NIPPLE, LEFT (HCC): ICD-10-CM

## 2023-09-13 DIAGNOSIS — Z00.00 ANNUAL PHYSICAL EXAM: Primary | ICD-10-CM

## 2023-09-13 DIAGNOSIS — M79.671 CHRONIC HEEL PAIN, RIGHT: ICD-10-CM

## 2023-09-13 LAB
BILIRUBIN, POC: NORMAL
BLOOD URINE, POC: NORMAL
CLARITY, POC: NORMAL
COLOR, POC: NORMAL
EST. AVERAGE GLUCOSE BLD GHB EST-MCNC: 114 MG/DL
GLUCOSE URINE, POC: NORMAL
HBA1C MFR BLD: 5.6 %
KETONES, POC: NORMAL
LEUKOCYTE EST, POC: NORMAL
NITRITE, POC: NORMAL
PH, POC: 5.5
PROTEIN, POC: NORMAL
SPECIFIC GRAVITY, POC: <=1.005
UROBILINOGEN, POC: 0.2

## 2023-09-13 PROCEDURE — 81002 URINALYSIS NONAUTO W/O SCOPE: CPT | Performed by: FAMILY MEDICINE

## 2023-09-13 PROCEDURE — 99396 PREV VISIT EST AGE 40-64: CPT | Performed by: FAMILY MEDICINE

## 2023-09-13 ASSESSMENT — PATIENT HEALTH QUESTIONNAIRE - PHQ9
SUM OF ALL RESPONSES TO PHQ QUESTIONS 1-9: 0
SUM OF ALL RESPONSES TO PHQ9 QUESTIONS 1 & 2: 0
SUM OF ALL RESPONSES TO PHQ QUESTIONS 1-9: 0
1. LITTLE INTEREST OR PLEASURE IN DOING THINGS: 0
2. FEELING DOWN, DEPRESSED OR HOPELESS: 0

## 2024-01-23 LAB
BV DNA PNL VAG NAA+PROBE: NOT DETECTED
CANDIDA 6 SPECIES PROFILE, NAA: NOT DETECTED
CANDIDA GLABRATA: NOT DETECTED
CANDIDA KRUSEI: NOT DETECTED
CHLAMYDIA TRACHOMATIS AMPLIFIED DET: NOT DETECTED
HB: SOURCE: NORMAL
N GONORRHOEAE AMPLIFIED DET: NOT DETECTED
SPECIMEN TYPE: NORMAL
VAGINAL PATHOGENS DNA PANEL: NOT DETECTED

## 2024-02-09 LAB — CREAT SERPL-MCNC: 0.7 MG/DL (ref 0.6–1.2)

## 2024-02-10 LAB
ESTRADIOL, SENSITIVE: <20 PG/ML
FOLLICLE STIMULATING HORMONE: 79.5 MIU/ML
PROGESTERONE LEVEL: 0.5 NG/ML

## 2024-03-07 LAB
ALBUMIN SERPL-MCNC: 4.4 G/DL (ref 3.5–5.7)
ALP BLD-CCNC: 94 IU/L (ref 35–135)
ALT SERPL-CCNC: 16 IU/L (ref 10–60)
ANION GAP SERPL CALCULATED.3IONS-SCNC: 12 MMOL/L (ref 4–16)
AST SERPL-CCNC: 17 IU/L (ref 10–40)
BILIRUB SERPL-MCNC: 0.5 MG/DL (ref 0–1.2)
BUN BLDV-MCNC: 16 MG/DL (ref 8–26)
CALCIUM SERPL-MCNC: 10.2 MG/DL (ref 8.5–10.4)
CHLORIDE BLD-SCNC: 102 MEQ/L (ref 98–111)
CO2: 27 MMOL/L (ref 21–31)
CREAT SERPL-MCNC: 0.69 MG/DL (ref 0.6–1.2)
EGFR (CKD-EPI): 99 ML/MIN/1.73 M2
GLUCOSE BLD-MCNC: 101 MG/DL (ref 70–99)
HCT VFR BLD CALC: 36.2 % (ref 36–46)
HEMOGLOBIN: 12.3 G/DL (ref 12–15.2)
MCH RBC QN AUTO: 30.9 PG (ref 27–33)
MCHC RBC AUTO-ENTMCNC: 33.9 G/DL (ref 32–36)
MCV RBC AUTO: 91.2 FL (ref 82–97)
PDW BLD-RTO: 13.8 % (ref 12.3–17)
PLATELET # BLD: 441 THOU/MCL (ref 140–375)
PMV BLD AUTO: 7.6 FL (ref 7.4–11.5)
POTASSIUM SERPL-SCNC: 3.9 MEQ/L (ref 3.6–5.1)
RBC # BLD: 3.97 MIL/MCL (ref 3.8–5.2)
SODIUM BLD-SCNC: 141 MEQ/L (ref 135–145)
TOTAL PROTEIN: 7.4 G/DL (ref 6–8)
WBC # BLD: 11.7 THOU/MCL (ref 3.6–10.5)

## 2024-03-13 ENCOUNTER — OFFICE VISIT (OUTPATIENT)
Dept: FAMILY MEDICINE CLINIC | Age: 59
End: 2024-03-13
Payer: COMMERCIAL

## 2024-03-13 ENCOUNTER — HOSPITAL ENCOUNTER (OUTPATIENT)
Dept: VASCULAR LAB | Age: 59
Discharge: HOME | End: 2024-03-13
Payer: COMMERCIAL

## 2024-03-13 VITALS
HEART RATE: 96 BPM | HEIGHT: 61 IN | SYSTOLIC BLOOD PRESSURE: 124 MMHG | BODY MASS INDEX: 26.43 KG/M2 | OXYGEN SATURATION: 98 % | DIASTOLIC BLOOD PRESSURE: 81 MMHG | WEIGHT: 140 LBS

## 2024-03-13 DIAGNOSIS — M25.461 SWELLING OF RIGHT KNEE JOINT: ICD-10-CM

## 2024-03-13 DIAGNOSIS — M79.89 CALF SWELLING: Primary | ICD-10-CM

## 2024-03-13 DIAGNOSIS — C50.012 PAGET'S CARCINOMA OF THE NIPPLE, LEFT (HCC): ICD-10-CM

## 2024-03-13 DIAGNOSIS — M79.89 CALF SWELLING: ICD-10-CM

## 2024-03-13 PROCEDURE — 99213 OFFICE O/P EST LOW 20 MIN: CPT | Performed by: FAMILY MEDICINE

## 2024-03-13 PROCEDURE — 93971 EXTREMITY STUDY: CPT

## 2024-03-13 ASSESSMENT — PATIENT HEALTH QUESTIONNAIRE - PHQ9
2. FEELING DOWN, DEPRESSED OR HOPELESS: 0
1. LITTLE INTEREST OR PLEASURE IN DOING THINGS: 0
SUM OF ALL RESPONSES TO PHQ QUESTIONS 1-9: 0
SUM OF ALL RESPONSES TO PHQ QUESTIONS 1-9: 0
SUM OF ALL RESPONSES TO PHQ9 QUESTIONS 1 & 2: 0
SUM OF ALL RESPONSES TO PHQ QUESTIONS 1-9: 0
SUM OF ALL RESPONSES TO PHQ QUESTIONS 1-9: 0

## 2024-03-13 NOTE — PROGRESS NOTES
counseling and educating the patient (and family), ordering medications ,tests, and procedures as needed, refilling medication(s), placing referral(s) when needed in addition to coordinating care for this patient and documenting in electronic health record

## 2024-03-15 ENCOUNTER — TELEPHONE (OUTPATIENT)
Dept: ORTHOPEDIC SURGERY | Age: 59
End: 2024-03-15

## 2024-03-15 NOTE — TELEPHONE ENCOUNTER
General Question     Subject: CYST INQUIRY   Patient and /or Facility Request: ARNULFO GARRETT   Contact Number: [phone]    PATIENT CALLED TO SPEAK WITH CLINICAL IN REGARD TO CYST INQUIRY. SHE STATED THAT THE CYST'S SIZE HAS INCREASED AND HAS BEEN PAINFUL. IT'S ALSO TRAVELING NOW THE CALF TO THE ANKLE/FOOT.     SHE CURRENTLY SCHEDULED FOR TUESDAY 3/19/24-8A    PLEASE ADVISE

## 2024-03-15 NOTE — TELEPHONE ENCOUNTER
I spoke to pt and let her know since she is a new patient, and we have not seen her in office yet, she would need to wait until her appt Tuesday, so she may be examined. Her Venous Doppler revealed a \"cystic structure seen in the popliteal space of the right leg\". I advised she use ice and take an antiinflammatory, both of which she state she is doing.

## 2024-03-19 ENCOUNTER — OFFICE VISIT (OUTPATIENT)
Dept: ORTHOPEDIC SURGERY | Age: 59
End: 2024-03-19
Payer: COMMERCIAL

## 2024-03-19 VITALS — RESPIRATION RATE: 16 BRPM | WEIGHT: 138 LBS | BODY MASS INDEX: 26.06 KG/M2 | HEIGHT: 61 IN

## 2024-03-19 DIAGNOSIS — M71.21 SYNOVIAL CYST OF RIGHT POPLITEAL SPACE: Primary | ICD-10-CM

## 2024-03-19 DIAGNOSIS — M25.461 EFFUSION OF RIGHT KNEE: ICD-10-CM

## 2024-03-19 DIAGNOSIS — M25.561 ACUTE PAIN OF RIGHT KNEE: ICD-10-CM

## 2024-03-19 PROCEDURE — 99203 OFFICE O/P NEW LOW 30 MIN: CPT | Performed by: ORTHOPAEDIC SURGERY

## 2024-03-19 PROCEDURE — 20610 DRAIN/INJ JOINT/BURSA W/O US: CPT | Performed by: ORTHOPAEDIC SURGERY

## 2024-03-19 RX ORDER — BUPIVACAINE HYDROCHLORIDE 2.5 MG/ML
4 INJECTION, SOLUTION INFILTRATION; PERINEURAL ONCE
Status: COMPLETED | OUTPATIENT
Start: 2024-03-19 | End: 2024-03-19

## 2024-03-19 RX ORDER — TRIAMCINOLONE ACETONIDE 40 MG/ML
40 INJECTION, SUSPENSION INTRA-ARTICULAR; INTRAMUSCULAR ONCE
Status: COMPLETED | OUTPATIENT
Start: 2024-03-19 | End: 2024-03-19

## 2024-03-19 RX ORDER — LIDOCAINE HYDROCHLORIDE 10 MG/ML
4 INJECTION, SOLUTION INFILTRATION; PERINEURAL ONCE
Status: COMPLETED | OUTPATIENT
Start: 2024-03-19 | End: 2024-03-19

## 2024-03-19 RX ADMIN — BUPIVACAINE HYDROCHLORIDE 10 MG: 2.5 INJECTION, SOLUTION INFILTRATION; PERINEURAL at 08:27

## 2024-03-19 RX ADMIN — LIDOCAINE HYDROCHLORIDE 4 ML: 10 INJECTION, SOLUTION INFILTRATION; PERINEURAL at 08:28

## 2024-03-19 RX ADMIN — TRIAMCINOLONE ACETONIDE 40 MG: 40 INJECTION, SUSPENSION INTRA-ARTICULAR; INTRAMUSCULAR at 08:28

## 2024-03-19 NOTE — PROGRESS NOTES
CHIEF COMPLAINT: Right knee pain swelling.    History:   Rocío Reyes is a 59 y.o. female referred by Kami Mancini MD for evaluation and treatment of right knee pain / injury. The patient complains of bilateral knee pain. This is evaluated as a personal injury.  The pain began 1-1/2 weeks ago. Rates pain 8/10. There was not a history of injury. She did have appendectomy and hysterectomy about 4 weeks ago.  The pain is located mostly anterior.  She does have some discomfort in her popliteal fossa.  She complains of calf swelling.  She did have an ultrasound of her right leg  The knee has not given out or felt unstable. The patient notes difficulty with bend and straighten the knee fully. There is swelling in the knee.  There was not catching / locking of the knee.  The patient has not had PT. The patient has not had an injection. The patient has taken NSAIDs, ibuprofen with relief. The patient has tried ice, with relief.  The patient's occupation is  at [employer].      Past Medical History:   Diagnosis Date    Breast cancer ([facility]) 2018    paget disease left breast    Incomplete RBBB     Paget disease of breast, left ([facility])        Past Surgical History:   Procedure Laterality Date    BREAST BIOPSY  2018    left breast      SECTION      CHOLECYSTECTOMY, LAPAROSCOPIC  2014    COLONOSCOPY  2008    ENDOMETRIAL ABLATION      MT MASTECTOMY PARTIAL Left 2018    LEFT CENTRAL LUMPECTOMY, BIOZORB MARKER performed by Jennifer Burk MD at [facility] OR       Family History   Problem Relation Age of Onset    Lupus Mother     Cancer Mother 70        colon cancer    Cancer Father 71        renal cancer    Cancer Paternal Grandfather         Throat    Hypertension Maternal Grandmother     Coronary Art Dis Maternal Grandmother     Breast Cancer Maternal Grandmother     Ovarian Cancer Paternal Grandmother        Social History     Socioeconomic History    Marital

## 2024-04-01 ENCOUNTER — TELEPHONE (OUTPATIENT)
Dept: ORTHOPEDIC SURGERY | Age: 59
End: 2024-04-01

## 2024-04-01 ENCOUNTER — OFFICE VISIT (OUTPATIENT)
Dept: ORTHOPEDIC SURGERY | Age: 59
End: 2024-04-01
Payer: COMMERCIAL

## 2024-04-01 VITALS — RESPIRATION RATE: 16 BRPM | HEIGHT: 61 IN | WEIGHT: 137 LBS | BODY MASS INDEX: 25.86 KG/M2

## 2024-04-01 DIAGNOSIS — M25.561 ACUTE PAIN OF RIGHT KNEE: Primary | ICD-10-CM

## 2024-04-01 PROCEDURE — 99213 OFFICE O/P EST LOW 20 MIN: CPT | Performed by: ORTHOPAEDIC SURGERY

## 2024-04-01 NOTE — TELEPHONE ENCOUNTER
OTHER    PATIENT CALLED TO REPORT TO CLINICAL THAT ORIGINAL MRI APPOINTMENT HAS BEEN RESCHEDULED. HER NEW MRI APPOINTMENT IS SCHEDULED FOR 4/4/24-10:45A [place].    PLEASE ADVISE

## 2024-04-01 NOTE — TELEPHONE ENCOUNTER
S/W Rocío Reyes  regarding MRI RT KNEE approval and authorization being valid until 6/30/2024.  Patient was instructed that their MRI has been scheduled  at [facility] :  4/9/2024 at 8:30am with an arrival time of 7:45-8:00am.     The patient was instructed to contact the facility if there is a need to reschedule at [phone]. She states they will be out of town starting Saturday and this appointment will not work for her.     A follow up appointment will need to be scheduled to review the results and treatment plan.     She was transferred to [facility] to r/s her MRI at her convenience.

## 2024-04-01 NOTE — PROGRESS NOTES
CHIEF COMPLAINT: Right knee pain swelling.    History:   Rocío Reyes is a 59 y.o. female referred by Kami Mancini MD for evaluation and treatment of right knee pain / injury. The patient complains of bilateral knee pain. This is evaluated as a personal injury.  The pain began 1-1/2 weeks ago. Rates pain 8/10. There was not a history of injury. She did have appendectomy and hysterectomy about 4 weeks ago.  The pain is located mostly anterior.  She does have some discomfort in her popliteal fossa.  She complains of calf swelling.  She did have an ultrasound of her right leg  The knee has not given out or felt unstable. The patient notes difficulty with bend and straighten the knee fully. There is swelling in the knee.  There was not catching / locking of the knee.  The patient has not had PT. The patient has not had an injection. The patient has taken NSAIDs, ibuprofen with relief. The patient has tried ice, with relief.  The patient's occupation is  at [employer].    Interval History: She was last seen about 2 weeks ago.  She states injection helped a lot.  However, she started having significant knee swelling yesterday.  There is no history of injury           Past Medical History:   Diagnosis Date    Breast cancer (HCC) 2018    paget disease left breast    Incomplete RBBB     Paget disease of breast, left (HCC)        Past Surgical History:   Procedure Laterality Date    BREAST BIOPSY  2018    left breast      SECTION      CHOLECYSTECTOMY, LAPAROSCOPIC  2014    COLONOSCOPY  2008    ENDOMETRIAL ABLATION      MI MASTECTOMY PARTIAL Left 2018    LEFT CENTRAL LUMPECTOMY, BIOZORB MARKER performed by Jennifer Burk MD at [facility] OR       Family History   Problem Relation Age of Onset    Lupus Mother     Cancer Mother 70        colon cancer    Cancer Father 71        renal cancer    Cancer Paternal Grandfather         Throat    Hypertension

## 2024-04-04 ENCOUNTER — HOSPITAL ENCOUNTER (OUTPATIENT)
Dept: MRI IMAGING | Age: 59
Discharge: HOME | End: 2024-04-04
Attending: ORTHOPAEDIC SURGERY
Payer: COMMERCIAL

## 2024-04-04 DIAGNOSIS — M25.561 ACUTE PAIN OF RIGHT KNEE: ICD-10-CM

## 2024-04-04 PROCEDURE — 73721 MRI JNT OF LWR EXTRE W/O DYE: CPT

## 2024-04-11 ENCOUNTER — OFFICE VISIT (OUTPATIENT)
Dept: ORTHOPEDIC SURGERY | Age: 59
End: 2024-04-11
Payer: COMMERCIAL

## 2024-04-11 VITALS — BODY MASS INDEX: 26.06 KG/M2 | RESPIRATION RATE: 16 BRPM | WEIGHT: 138 LBS | HEIGHT: 61 IN

## 2024-04-11 DIAGNOSIS — S83.241A OTHER TEAR OF MEDIAL MENISCUS OF RIGHT KNEE AS CURRENT INJURY, INITIAL ENCOUNTER: Primary | ICD-10-CM

## 2024-04-11 PROCEDURE — 99213 OFFICE O/P EST LOW 20 MIN: CPT | Performed by: ORTHOPAEDIC SURGERY

## 2024-04-11 NOTE — PROGRESS NOTES
Coronary Art Dis Maternal Grandmother     Breast Cancer Maternal Grandmother     Ovarian Cancer Paternal Grandmother        Social History     Socioeconomic History    Marital status:      Spouse name: Rohit    Number of children: 3    Years of education: None    Highest education level: None   Tobacco Use    Smoking status: Never    Smokeless tobacco: Never    Tobacco comments:     college days   Substance and Sexual Activity    Alcohol use: Yes     Comment: occ    Drug use: No    Sexual activity: Yes     Partners: Male     Social Determinants of Health     Financial Resource Strain: Low Risk  (5/12/2023)    Overall Financial Resource Strain (CARDIA)     Difficulty of Paying Living Expenses: Not hard at all   Transportation Needs: Unknown (5/12/2023)    PRAPARE - Transportation     Lack of Transportation (Non-Medical): No   Housing Stability: Unknown (5/12/2023)    Housing Stability Vital Sign     Unstable Housing in the Last Year: No       Current Outpatient Medications   Medication Sig Dispense Refill    fluticasone (FLONASE) 50 MCG/ACT nasal spray 1 spray by Each Nostril route daily 16 g 0    fexofenadine (ALLEGRA) 180 MG tablet Take 1 tablet by mouth daily 30 tablet 5    famotidine (PEPCID) 20 MG tablet Take 1 tablet by mouth in the morning and 1 tablet before bedtime. 60 tablet 3    fluticasone (FLONASE) 50 MCG/ACT nasal spray 2 sprays by Nasal route daily 1 Bottle 0    vitamin D (CHOLECALCIFEROL) 1000 UNIT TABS tablet Take 2 tablets by mouth daily       No current facility-administered medications for this visit.       No Known Allergies      Physical Examination:     Vital signs:   Resp 16   Ht 1.549 m (5' 1\")   Wt 62.6 kg (138 lb)   LMP 08/03/2013 (Exact Date)   BMI 26.07 kg/m²     General:  alert, appears stated age, cooperative, and no distress   Gait: She now has a normal gait.     Right Knee  Alignment:  neutral   ROM:  0 degrees extension to 120 degrees flexion   Bilateral knees   Crepitus:

## 2024-05-17 ENCOUNTER — TELEPHONE (OUTPATIENT)
Dept: FAMILY MEDICINE CLINIC | Age: 59
End: 2024-05-17

## 2024-05-17 RX ORDER — PREDNISONE 20 MG/1
20 TABLET ORAL DAILY
Qty: 7 TABLET | Refills: 0 | Status: SHIPPED | OUTPATIENT
Start: 2024-05-17 | End: 2024-05-24

## 2024-05-17 RX ORDER — AMOXICILLIN AND CLAVULANATE POTASSIUM 875; 125 MG/1; MG/1
1 TABLET, FILM COATED ORAL 2 TIMES DAILY
Qty: 14 TABLET | Refills: 0 | Status: SHIPPED | OUTPATIENT
Start: 2024-05-17 | End: 2024-05-24

## 2024-05-17 NOTE — TELEPHONE ENCOUNTER
Patient called an is having sinus issues     Symptoms since end of April     [facility] no antibiotic was given   She was given [place] DMT   And flonase     It was helping but didn't clear anything up and not sinus are not clear     Taken Covid Test and Neg     [facility]

## 2024-05-17 NOTE — PROGRESS NOTES
Talked with patient  Thick nasal discharge decreased smell and taste  COVID test x 2 negative    Symptoms ongoing for over 10 days  Tried over-the-counter measures without relief    Augmentin 875 twice daily for 1 week  Continue Flonase  Prednisone 20 mg daily    Follow-up if symptoms persist or worsen

## 2024-08-01 ENCOUNTER — TELEPHONE (OUTPATIENT)
Dept: FAMILY MEDICINE CLINIC | Age: 59
End: 2024-08-01

## 2024-08-01 DIAGNOSIS — Z00.00 PHYSICAL EXAM, ROUTINE: ICD-10-CM

## 2024-08-01 DIAGNOSIS — R53.83 OTHER FATIGUE: ICD-10-CM

## 2024-08-01 DIAGNOSIS — E55.9 VITAMIN D DEFICIENCY: Primary | ICD-10-CM

## 2024-08-01 NOTE — TELEPHONE ENCOUNTER
Delmar Sandoval [facility] Practice Support  [facility] Referral Request    Reason for referral request: Lab/Test Order    Specialist/Lab/Test patient is requesting (if known): Blood work    Specialist Phone Number (if applicable):    Additional Information : Patient need an order for blood work, she said she preferred the lab near from her location.  --------------------------------------------------------------------------------------------------------------------------    Relationship to Patient: Self    Call Back Information: OK to leave message on voicemail  Preferred Call Back Number: Phone [phone]

## 2024-08-21 LAB — MAMMOGRAPHY, EXTERNAL: NORMAL

## 2024-10-08 DIAGNOSIS — E55.9 VITAMIN D DEFICIENCY: ICD-10-CM

## 2024-10-08 DIAGNOSIS — Z00.00 PHYSICAL EXAM, ROUTINE: ICD-10-CM

## 2024-10-08 DIAGNOSIS — R53.83 OTHER FATIGUE: ICD-10-CM

## 2024-10-08 LAB
25(OH)D3 SERPL-MCNC: 25.7 NG/ML
ALBUMIN SERPL-MCNC: 4.4 G/DL (ref 3.4–5)
ALBUMIN/GLOB SERPL: 2 {RATIO} (ref 1.1–2.2)
ALP SERPL-CCNC: 87 U/L (ref 40–129)
ALT SERPL-CCNC: 17 U/L (ref 10–40)
ANION GAP SERPL CALCULATED.3IONS-SCNC: 9 MMOL/L (ref 3–16)
AST SERPL-CCNC: 23 U/L (ref 15–37)
BASOPHILS # BLD: 0 K/UL (ref 0–0.2)
BASOPHILS NFR BLD: 0.5 %
BILIRUB SERPL-MCNC: 0.5 MG/DL (ref 0–1)
BUN SERPL-MCNC: 13 MG/DL (ref 7–20)
CALCIUM SERPL-MCNC: 9.9 MG/DL (ref 8.3–10.6)
CHLORIDE SERPL-SCNC: 104 MMOL/L (ref 99–110)
CHOLEST SERPL-MCNC: 217 MG/DL (ref 0–199)
CO2 SERPL-SCNC: 27 MMOL/L (ref 21–32)
CREAT SERPL-MCNC: 0.7 MG/DL (ref 0.6–1.2)
DEPRECATED RDW RBC AUTO: 12.4 % (ref 12.4–15.4)
EOSINOPHIL # BLD: 0.2 K/UL (ref 0–0.6)
EOSINOPHIL NFR BLD: 3.3 %
FOLATE SERPL-MCNC: 25.6 NG/ML (ref 4.78–24.2)
GFR SERPLBLD CREATININE-BSD FMLA CKD-EPI: >90 ML/MIN/{1.73_M2}
GLUCOSE P FAST SERPL-MCNC: 92 MG/DL (ref 70–99)
HCT VFR BLD AUTO: 41.2 % (ref 36–48)
HDLC SERPL-MCNC: 61 MG/DL (ref 40–60)
HGB BLD-MCNC: 13.5 G/DL (ref 12–16)
LDL CHOLESTEROL: 125 MG/DL
LYMPHOCYTES # BLD: 2.7 K/UL (ref 1–5.1)
LYMPHOCYTES NFR BLD: 43.4 %
MCH RBC QN AUTO: 30.5 PG (ref 26–34)
MCHC RBC AUTO-ENTMCNC: 32.8 G/DL (ref 31–36)
MCV RBC AUTO: 93.1 FL (ref 80–100)
MONOCYTES # BLD: 0.8 K/UL (ref 0–1.3)
MONOCYTES NFR BLD: 12.4 %
NEUTROPHILS # BLD: 2.5 K/UL (ref 1.7–7.7)
NEUTROPHILS NFR BLD: 40.4 %
PLATELET # BLD AUTO: 296 K/UL (ref 135–450)
PMV BLD AUTO: 8.4 FL (ref 5–10.5)
POTASSIUM SERPL-SCNC: 3.8 MMOL/L (ref 3.5–5.1)
PROT SERPL-MCNC: 6.6 G/DL (ref 6.4–8.2)
RBC # BLD AUTO: 4.43 M/UL (ref 4–5.2)
SODIUM SERPL-SCNC: 140 MMOL/L (ref 136–145)
TRIGL SERPL-MCNC: 153 MG/DL (ref 0–150)
TSH SERPL DL<=0.005 MIU/L-ACNC: 2.32 UIU/ML (ref 0.27–4.2)
VIT B12 SERPL-MCNC: 385 PG/ML (ref 211–911)
VLDLC SERPL CALC-MCNC: 31 MG/DL
WBC # BLD AUTO: 6.2 K/UL (ref 4–11)

## 2024-10-09 LAB
EST. AVERAGE GLUCOSE BLD GHB EST-MCNC: 108.3 MG/DL
HBA1C MFR BLD: 5.4 %

## 2024-10-17 NOTE — PROGRESS NOTES
Chief Complaint:   Rocío Reyes is a 59 y.o. female who presents for complete physical examination    History of Present Illness:    Meds, vitamins and allergies reviewed with pt  Labs reviewed with pt in detail    Offer flu vaccine today, agreeable    Overall doing well, eating healthy and exercising regularly  Gets her mammograms through Dr. 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    COVID-19 virus infection     Past Medical History:   Diagnosis Date    Breast cancer (HCC) 2018    paget disease left breast    Incomplete RBBB     Paget disease of breast, left (HCC)        Past Surgical History:   Procedure Laterality Date    BREAST BIOPSY  2018    left breast      SECTION      CHOLECYSTECTOMY, LAPAROSCOPIC  2014    COLONOSCOPY  2008    ENDOMETRIAL ABLATION      MS MASTECTOMY PARTIAL Left 2018    LEFT CENTRAL LUMPECTOMY, BIOZORB MARKER performed by Jennifer Burk MD at [facility] OR       Current Outpatient Medications   Medication Sig Dispense Refill    vitamin D 50 MCG (2000 UT) CAPS capsule Take 1 capsule by mouth daily 90 capsule 3    vitamin D (CHOLECALCIFEROL) 1000 UNIT TABS tablet Take 2 tablets by mouth daily       No current facility-administered medications for this visit.     No Known Allergies    Social History     Socioeconomic History    Marital status:      Spouse name: Rohit    Number of children: 3    Years of education: None    Highest education level: None   Tobacco Use    Smoking status: Never    Smokeless tobacco: Never    Tobacco comments:     college days

## 2024-10-18 ENCOUNTER — OFFICE VISIT (OUTPATIENT)
Dept: FAMILY MEDICINE CLINIC | Age: 59
End: 2024-10-18

## 2024-10-18 VITALS
WEIGHT: 146 LBS | SYSTOLIC BLOOD PRESSURE: 136 MMHG | HEART RATE: 67 BPM | BODY MASS INDEX: 27.56 KG/M2 | DIASTOLIC BLOOD PRESSURE: 82 MMHG | HEIGHT: 61 IN

## 2024-10-18 DIAGNOSIS — C50.012 PAGET'S CARCINOMA OF THE NIPPLE, LEFT (HCC): ICD-10-CM

## 2024-10-18 DIAGNOSIS — Z00.00 ANNUAL PHYSICAL EXAM: Primary | ICD-10-CM

## 2024-10-18 DIAGNOSIS — Z23 FLU VACCINE NEED: ICD-10-CM

## 2024-10-18 DIAGNOSIS — E55.9 VITAMIN D DEFICIENCY: ICD-10-CM

## 2024-10-18 LAB
BILIRUBIN, POC: NORMAL
BLOOD URINE, POC: NORMAL
CLARITY, POC: CLEAR
COLOR, POC: YELLOW
GLUCOSE URINE, POC: NORMAL MG/DL
KETONES, POC: NORMAL MG/DL
LEUKOCYTE EST, POC: NORMAL
NITRITE, POC: NORMAL
PH, POC: 7
PROTEIN, POC: NORMAL MG/DL
SPECIFIC GRAVITY, POC: 1.01
UROBILINOGEN, POC: 0.2 MG/DL

## 2024-10-18 RX ORDER — MULTIVIT-MIN/IRON/FOLIC ACID/K 18-600-40
2000 CAPSULE ORAL DAILY
Qty: 90 CAPSULE | Refills: 3 | Status: SHIPPED | OUTPATIENT
Start: 2024-10-18

## 2024-10-18 SDOH — ECONOMIC STABILITY: FOOD INSECURITY: WITHIN THE PAST 12 MONTHS, THE FOOD YOU BOUGHT JUST DIDN'T LAST AND YOU DIDN'T HAVE MONEY TO GET MORE.: NEVER TRUE

## 2024-10-18 SDOH — ECONOMIC STABILITY: FOOD INSECURITY: WITHIN THE PAST 12 MONTHS, YOU WORRIED THAT YOUR FOOD WOULD RUN OUT BEFORE YOU GOT MONEY TO BUY MORE.: NEVER TRUE

## 2024-10-18 SDOH — ECONOMIC STABILITY: INCOME INSECURITY: HOW HARD IS IT FOR YOU TO PAY FOR THE VERY BASICS LIKE FOOD, HOUSING, MEDICAL CARE, AND HEATING?: NOT HARD AT ALL

## 2024-10-18 ASSESSMENT — PATIENT HEALTH QUESTIONNAIRE - PHQ9
SUM OF ALL RESPONSES TO PHQ QUESTIONS 1-9: 0
SUM OF ALL RESPONSES TO PHQ9 QUESTIONS 1 & 2: 0
SUM OF ALL RESPONSES TO PHQ QUESTIONS 1-9: 0
2. FEELING DOWN, DEPRESSED OR HOPELESS: NOT AT ALL
SUM OF ALL RESPONSES TO PHQ QUESTIONS 1-9: 0
SUM OF ALL RESPONSES TO PHQ QUESTIONS 1-9: 0
1. LITTLE INTEREST OR PLEASURE IN DOING THINGS: NOT AT ALL

## (undated) DEVICE — ELECTRODE PT RET AD L9FT HI MOIST COND ADH HYDRGEL CORDED

## (undated) DEVICE — SUTURE ABSORBABLE BRAIDED 4-0 P-3 18 IN UD VICRYL J494G

## (undated) DEVICE — SYRINGE IRRIG 60ML SFT PLIABLE BLB EZ TO GRP 1 HND USE W/

## (undated) DEVICE — PENCIL ES L3M ROCK SWCH S STL HEX LOK BLDE ELECTRD HOLSTER

## (undated) DEVICE — MEDI-VAC NON-CONDUCTIVE SUCTION TUBING: Brand: CARDINAL HEALTH

## (undated) DEVICE — SUTURE VCRL SZ 3-0 L18IN ABSRB UD L26MM SH 1/2 CIR J864D

## (undated) DEVICE — STANDARD HYPODERMIC NEEDLE,POLYPROPYLENE HUB: Brand: MONOJECT

## (undated) DEVICE — GOWN,SIRUS,POLYRNF,SETINSLV,L,20/CS: Brand: MEDLINE

## (undated) DEVICE — BLADE ES ELASTOMERIC COAT INSUL DURABLE BEND UPTO 90DEG

## (undated) DEVICE — SUTURE VCRL SZ 3-0 L27IN ABSRB UD L26MM SH 1/2 CIR J416H

## (undated) DEVICE — TURNOVER KIT RM INF CTRL TECH

## (undated) DEVICE — SHEET, T, LAPAROTOMY, STERILE: Brand: MEDLINE

## (undated) DEVICE — STERILE POLYISOPRENE POWDER-FREE SURGICAL GLOVES WITH EMOLLIENT COATING: Brand: PROTEXIS

## (undated) DEVICE — COVER LT HNDL BLU PLAS

## (undated) DEVICE — CHLORAPREP 26ML ORANGE

## (undated) DEVICE — INTENDED USE FOR SURGICAL MARKING ON INTACT SKIN, ALSO PROVIDES A PERMANENT METHOD OF IDENTIFYING OBJECTS IN THE OPERATING ROOM: Brand: WRITESITE® PLUS MINI PREP RESISTANT MARKER

## (undated) DEVICE — GOWN,SIRUS,POLYRNF,SETINSLV,XL,20/CS: Brand: MEDLINE

## (undated) DEVICE — PACK,BASIC: Brand: MEDLINE

## (undated) DEVICE — SUTURE CHROMIC GUT SZ 5-0 L18IN ABSRB BRN P-3 L13MM 3/8 CIR 687G

## (undated) DEVICE — SYSTEM SKIN CLSR 22CM DERMBND PRINEO

## (undated) DEVICE — SOLUTION IV 1000ML 0.9% SOD CHL

## (undated) DEVICE — INTENDED FOR TISSUE SEPARATION, AND OTHER PROCEDURES THAT REQUIRE A SHARP SURGICAL BLADE TO PUNCTURE OR CUT.: Brand: BARD-PARKER ® CARBON RIB-BACK BLADES

## (undated) DEVICE — DRAPE,CHEST,FENES,15X10,STERIL: Brand: MEDLINE

## (undated) DEVICE — STERILE LATEX POWDER-FREE SURGICAL GLOVESWITH NITRILE COATING: Brand: PROTEXIS

## (undated) DEVICE — GAUZE,SPONGE,4"X4",16PLY,XRAY,STRL,LF: Brand: MEDLINE

## (undated) DEVICE — 3M™ WARMING BLANKET, LOWER BODY, 10 PER CASE, 42568: Brand: BAIR HUGGER™

## (undated) DEVICE — SPONGE,LAP,18"X18",DLX,XR,ST,5/PK,40/PK: Brand: MEDLINE

## (undated) DEVICE — GARMENT,MEDLINE,DVT,INT,CALF,MED, GEN2: Brand: MEDLINE

## (undated) DEVICE — SUTURE VCRL SZ 2-0 L18IN ABSRB VLT L26MM SH 1/2 CIR J775D

## (undated) DEVICE — SURGICAL SET UP - SURE SET: Brand: MEDLINE INDUSTRIES, INC.

## (undated) DEVICE — SPECIMEN ORIENTATION CHARMS, SIX DISTINCTLY SHAPED STERILE 10MM CHARMS: Brand: MARGINMAP

## (undated) DEVICE — SYRINGE MED 10ML TRNSLUC BRL PLUNG BLK MRK POLYPR CTRL